# Patient Record
Sex: MALE | Race: WHITE | Employment: OTHER | URBAN - METROPOLITAN AREA
[De-identification: names, ages, dates, MRNs, and addresses within clinical notes are randomized per-mention and may not be internally consistent; named-entity substitution may affect disease eponyms.]

---

## 2017-01-03 ENCOUNTER — HOSPITAL ENCOUNTER (OUTPATIENT)
Dept: LAB | Age: 67
Discharge: HOME | End: 2017-01-03
Attending: INTERNAL MEDICINE
Payer: COMMERCIAL

## 2017-01-03 DIAGNOSIS — R55 SYNCOPE, UNSPECIFIED SYNCOPE TYPE: ICD-10-CM

## 2017-01-03 PROBLEM — M54.2 NECK PAIN: Status: ACTIVE | Noted: 2017-01-03

## 2017-01-03 PROBLEM — I10 ESSENTIAL HYPERTENSION WITH GOAL BLOOD PRESSURE LESS THAN 130/85: Status: ACTIVE | Noted: 2017-01-03

## 2017-01-03 LAB
ANION GAP BLD CALC-SCNC: 9 MMOL/L
BASOPHILS # BLD AUTO: 0 K/UL (ref 0–0.2)
BASOPHILS # BLD: 0 % (ref 0–2)
BUN SERPL-MCNC: 13 MG/DL (ref 8–23)
CALCIUM SERPL-MCNC: 8.6 MG/DL (ref 8.3–10.4)
CHLORIDE SERPL-SCNC: 106 MMOL/L (ref 98–107)
CO2 SERPL-SCNC: 27 MMOL/L (ref 23–32)
CREAT SERPL-MCNC: 1 MG/DL (ref 0.6–1)
DIFFERENTIAL METHOD BLD: ABNORMAL
EOSINOPHIL # BLD: 0.3 K/UL (ref 0–0.8)
EOSINOPHIL NFR BLD: 3 % (ref 0.5–7.8)
ERYTHROCYTE [DISTWIDTH] IN BLOOD BY AUTOMATED COUNT: 14.6 % (ref 11.9–14.6)
GLUCOSE SERPL-MCNC: 87 MG/DL (ref 65–100)
HCT VFR BLD AUTO: 42.8 % (ref 41.1–50.3)
HGB BLD-MCNC: 14.5 G/DL (ref 13.6–17.2)
LYMPHOCYTES # BLD AUTO: 18 % (ref 13–44)
LYMPHOCYTES # BLD: 1.4 K/UL (ref 0.5–4.6)
MCH RBC QN AUTO: 28.9 PG (ref 26.1–32.9)
MCHC RBC AUTO-ENTMCNC: 33.9 G/DL (ref 31.4–35)
MCV RBC AUTO: 85.3 FL (ref 79.6–97.8)
MONOCYTES # BLD: 0.5 K/UL (ref 0.1–1.3)
MONOCYTES NFR BLD AUTO: 6 % (ref 4–12)
NEUTS SEG # BLD: 5.8 K/UL (ref 1.7–8.2)
NEUTS SEG NFR BLD AUTO: 73 % (ref 43–78)
PLATELET # BLD AUTO: 248 K/UL (ref 150–450)
PMV BLD AUTO: 10.1 FL (ref 10.8–14.1)
POTASSIUM SERPL-SCNC: 4.2 MMOL/L (ref 3.5–5.1)
RBC # BLD AUTO: 5.02 M/UL (ref 4.23–5.67)
SODIUM SERPL-SCNC: 142 MMOL/L (ref 136–145)
WBC # BLD AUTO: 7.9 K/UL (ref 4.3–11.1)

## 2017-01-03 PROCEDURE — 80048 BASIC METABOLIC PNL TOTAL CA: CPT | Performed by: INTERNAL MEDICINE

## 2017-01-03 PROCEDURE — 36415 COLL VENOUS BLD VENIPUNCTURE: CPT | Performed by: INTERNAL MEDICINE

## 2017-01-03 PROCEDURE — 85025 COMPLETE CBC W/AUTO DIFF WBC: CPT | Performed by: INTERNAL MEDICINE

## 2017-01-06 ENCOUNTER — HOSPITAL ENCOUNTER (OUTPATIENT)
Dept: CT IMAGING | Age: 67
Discharge: HOME | End: 2017-01-06
Attending: INTERNAL MEDICINE
Payer: COMMERCIAL

## 2017-01-06 DIAGNOSIS — R55 SYNCOPE, UNSPECIFIED SYNCOPE TYPE: ICD-10-CM

## 2017-01-06 PROCEDURE — 70450 CT HEAD/BRAIN W/O DYE: CPT

## 2017-01-16 NOTE — PROGRESS NOTES
Pls call to say head CT was unremarkable. He has mild carotid disease on ultrasound, no significant blockage.   DEA

## 2017-02-21 PROBLEM — I77.9 BILATERAL CAROTID ARTERY DISEASE (HCC): Status: ACTIVE | Noted: 2017-02-21

## 2017-02-21 PROBLEM — I71.20 THORACIC AORTIC ANEURYSM WITHOUT RUPTURE: Status: ACTIVE | Noted: 2017-02-21

## 2017-07-18 ENCOUNTER — HOSPITAL ENCOUNTER (OUTPATIENT)
Dept: CT IMAGING | Age: 67
Discharge: HOME | End: 2017-07-18
Attending: INTERNAL MEDICINE
Payer: MEDICARE

## 2017-07-18 DIAGNOSIS — I71.20 THORACIC AORTIC ANEURYSM WITHOUT RUPTURE: ICD-10-CM

## 2017-07-18 DIAGNOSIS — I10 ESSENTIAL HYPERTENSION: ICD-10-CM

## 2017-07-18 LAB — CREAT BLD-MCNC: 1.1 MG/DL (ref 0.6–1)

## 2017-07-18 PROCEDURE — 74011636320 HC RX REV CODE- 636/320: Performed by: INTERNAL MEDICINE

## 2017-07-18 PROCEDURE — 74011000258 HC RX REV CODE- 258: Performed by: INTERNAL MEDICINE

## 2017-07-18 PROCEDURE — 71275 CT ANGIOGRAPHY CHEST: CPT

## 2017-07-18 PROCEDURE — 82565 ASSAY OF CREATININE: CPT

## 2017-07-18 RX ORDER — SODIUM CHLORIDE 0.9 % (FLUSH) 0.9 %
10 SYRINGE (ML) INJECTION
Status: COMPLETED | OUTPATIENT
Start: 2017-07-18 | End: 2017-07-18

## 2017-07-18 RX ADMIN — Medication 10 ML: at 09:24

## 2017-07-18 RX ADMIN — SODIUM CHLORIDE 100 ML: 900 INJECTION, SOLUTION INTRAVENOUS at 09:24

## 2017-07-18 RX ADMIN — IOPAMIDOL 119 ML: 755 INJECTION, SOLUTION INTRAVENOUS at 09:24

## 2017-07-18 NOTE — PROGRESS NOTES
Please call patient, CT was ok. Nothing emergent. But, I would like him to see Vascular Surgery on consult to review  high-grade ostial stenosis of the right vertebral artery. Again, everything was ok, just want another opinion.   Thanks, Janell Pedroza

## 2017-07-18 NOTE — PROGRESS NOTES
informed pt.of DR. Angel's response to CT and he voiced understanding. He is in agreement to having vascular surgery consult. Note was routed to scheduling to complete referral to vascular surgery.   Orders Placed This Encounter   VASCULAR SURGERY

## 2018-01-23 PROBLEM — I47.1 ATRIAL TACHYCARDIA, PAROXYSMAL (HCC): Status: ACTIVE | Noted: 2018-01-23

## 2018-05-04 PROBLEM — R55 SYNCOPE: Status: RESOLVED | Noted: 2017-01-03 | Resolved: 2018-05-04

## 2019-01-14 ENCOUNTER — HOSPITAL ENCOUNTER (OUTPATIENT)
Dept: LAB | Age: 69
Discharge: HOME | End: 2019-01-14

## 2019-01-14 PROCEDURE — 88305 TISSUE EXAM BY PATHOLOGIST: CPT

## 2019-01-25 ENCOUNTER — HOSPITAL ENCOUNTER (OUTPATIENT)
Dept: CT IMAGING | Age: 69
Discharge: HOME | End: 2019-01-25
Attending: INTERNAL MEDICINE
Payer: MEDICARE

## 2019-01-25 DIAGNOSIS — I71.20 THORACIC AORTIC ANEURYSM WITHOUT RUPTURE: ICD-10-CM

## 2019-01-25 LAB — CREAT BLD-MCNC: 1.2 MG/DL (ref 0.8–1.5)

## 2019-01-25 PROCEDURE — 71275 CT ANGIOGRAPHY CHEST: CPT

## 2019-01-25 PROCEDURE — 82565 ASSAY OF CREATININE: CPT

## 2019-01-25 PROCEDURE — 74011000258 HC RX REV CODE- 258: Performed by: INTERNAL MEDICINE

## 2019-01-25 PROCEDURE — 74011636320 HC RX REV CODE- 636/320: Performed by: INTERNAL MEDICINE

## 2019-01-25 RX ORDER — SODIUM CHLORIDE 0.9 % (FLUSH) 0.9 %
10 SYRINGE (ML) INJECTION
Status: COMPLETED | OUTPATIENT
Start: 2019-01-25 | End: 2019-01-25

## 2019-01-25 RX ADMIN — Medication 10 ML: at 13:18

## 2019-01-25 RX ADMIN — IOPAMIDOL 120 ML: 755 INJECTION, SOLUTION INTRAVENOUS at 13:18

## 2019-01-25 RX ADMIN — SODIUM CHLORIDE 100 ML: 900 INJECTION, SOLUTION INTRAVENOUS at 13:18

## 2019-01-25 NOTE — PROGRESS NOTES
Please call him, CTA was good, stable aneurysm, good news. I will see as planned, but great news.  Thanks

## 2019-12-23 PROBLEM — E66.09 CLASS 1 OBESITY DUE TO EXCESS CALORIES WITH SERIOUS COMORBIDITY AND BODY MASS INDEX (BMI) OF 30.0 TO 30.9 IN ADULT: Status: ACTIVE | Noted: 2019-12-23

## 2020-12-28 PROBLEM — J61 ASBESTOSIS (HCC): Status: ACTIVE | Noted: 2020-12-28

## 2020-12-28 PROBLEM — R35.1 NOCTURIA: Status: ACTIVE | Noted: 2020-12-28

## 2021-03-30 PROBLEM — J30.2 SEASONAL ALLERGIC RHINITIS: Status: ACTIVE | Noted: 2021-03-30

## 2022-03-18 PROBLEM — I47.1 ATRIAL TACHYCARDIA, PAROXYSMAL (HCC): Status: ACTIVE | Noted: 2018-01-23

## 2022-03-18 PROBLEM — E66.09 CLASS 1 OBESITY DUE TO EXCESS CALORIES WITH SERIOUS COMORBIDITY AND BODY MASS INDEX (BMI) OF 30.0 TO 30.9 IN ADULT: Status: ACTIVE | Noted: 2019-12-23

## 2022-03-18 PROBLEM — E66.811 CLASS 1 OBESITY DUE TO EXCESS CALORIES WITH SERIOUS COMORBIDITY AND BODY MASS INDEX (BMI) OF 30.0 TO 30.9 IN ADULT: Status: ACTIVE | Noted: 2019-12-23

## 2022-03-18 PROBLEM — I71.20 THORACIC AORTIC ANEURYSM WITHOUT RUPTURE (HCC): Status: ACTIVE | Noted: 2017-02-21

## 2022-03-18 PROBLEM — I47.19 ATRIAL TACHYCARDIA, PAROXYSMAL: Status: ACTIVE | Noted: 2018-01-23

## 2022-03-19 PROBLEM — R35.1 NOCTURIA: Status: ACTIVE | Noted: 2020-12-28

## 2022-03-19 PROBLEM — J30.2 SEASONAL ALLERGIC RHINITIS: Status: ACTIVE | Noted: 2021-03-30

## 2022-03-19 PROBLEM — I77.9 BILATERAL CAROTID ARTERY DISEASE (HCC): Status: ACTIVE | Noted: 2017-02-21

## 2022-03-19 PROBLEM — M54.2 NECK PAIN: Status: ACTIVE | Noted: 2017-01-03

## 2022-03-20 PROBLEM — J61 ASBESTOSIS (HCC): Status: ACTIVE | Noted: 2020-12-28

## 2022-06-07 ENCOUNTER — TELEPHONE (OUTPATIENT)
Dept: ORTHOPEDIC SURGERY | Age: 72
End: 2022-06-07

## 2022-08-18 DIAGNOSIS — R97.20 ELEVATED PSA: Primary | ICD-10-CM

## 2022-08-18 NOTE — PROGRESS NOTES
[facility]  [address] [place] [address]  [phone]          Gretchen Suárez  : 1950      INITIAL EVALUATION    Chief Complaint   Patient presents with    New Patient       HPI: Gretchen Suárez is a 71 y.o. male with Elevated PSA. Mr. Jarred Sanders lives in [place]. They spend a lot of time at the beach as well. He is  to my 6 . His primary care doctor checked a PSA and noted it was elevated at 4.1. In 2020 it was 2.2. At the time he also had some lower urinary tract symptoms primarily frequency urgency and nocturia. His urine was cultured and he did not have a UTI. He was started on Flomax and he states that his symptoms have improved greatly. He has no family history of prostate cancer. His mother had colon cancer and a paternal aunt had breast cancer. 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 He is done well since then. It was treated with chemoradiation. He also has a history of hypertension GERD CAD sleep apnea. Past surgical history includes multiple knee surgeries and a cardiac catheterization.       PMH:     Past Medical History:   Diagnosis Date    Abnormal EKG     Arthritis     osteo    CAD (coronary artery disease)     Cancer ([facility] [address] 2009    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     Chest pain, precordial     Chronic pain     knee    Coronary atherosclerosis of native coronary artery 2016    Dry mouth     since cancer treatment    GERD (gastroesophageal reflux disease)     controlled w/med    History of pneumonia 2007    Hypercholesteremia      diet controlled    Hypertension     controlled w/med    Malaise and fatigue     S/P total knee replacement using cement 2013    Shingles 2007    Sleep apnea     does not use or have CPAP anymore - sleeping good since 30-35 weight loss  per pt    Status post total History    Not on file   Tobacco Use    Smoking status: Never    Smokeless tobacco: Never   Substance and Sexual Activity    Alcohol use: Yes     Alcohol/week: 16.0 standard drinks    Drug use: No    Sexual activity: Not on file   Other Topics Concern    Not on file   Social History Narrative    Not on file     Social Determinants of Health     Financial Resource Strain: Not on file   Food Insecurity: Not on file   Transportation Needs: Not on file   Physical Activity: Not on file   Stress: Not on file   Social Connections: Not on file   Intimate Partner Violence: Not on file   Housing Stability: Not on file       ROS:   Review of Systems   Constitutional: Negative. Negative for chills, fatigue and fever. Respiratory: Negative. Cardiovascular: Negative. Gastrointestinal: Negative. Genitourinary: Negative. Negative for difficulty urinating, dysuria, flank pain, frequency, hematuria and urgency. Musculoskeletal: Negative. All other systems reviewed and are negative. PHYSICAL EXAM  GENERAL: Well-groomed, well-nourished, pleasant 71 y.o. male, in no acute distress. BP (!) 155/97   Pulse 73   Temp 98.4 °F (36.9 °C) (Oral)   Resp 16   Ht 5' 11\" (1.803 m)   Wt 211 lb 8 oz (95.9 kg)   SpO2 96%   BMI 29.50 kg/m²   General: well dressed, well nourished, no acute distress  Skin: no rashes  HEENT: Sclera are clear,normocephalic, atraumatic. no external lesions  Cardiovascular: Reg. Normal perfusion  Respiratory: normal respiratory effort, no JVD, no audible wheezing. Musculoskeletal: unremarkable with normal function. No embolic signs or cyanosis.    Neurologic exam: intact, no focal deficits, moves all 4 extremities  Psych: normal mood and affect, alert, oriented x 3  LE:  no edema  GI: soft, nontender, no masses, no CVA tenderness  GENITOURINARY: Circumcised phallus without mass or lesion; testes bilaterally descended without mass or nodule; no inguinal hernia; no pelvic adenopathy  SONIDO- 30/40+ cc gland; smooth, no nodules or induration      Labs:     Lab Results   Component Value Date/Time    PSA 5.9 08/19/2022 09:37 AM    PSA 2.2 12/28/2020 09:47 AM    PSA 1.8 12/23/2019 02:04 PM              Imaging/Radiology:        CT Results (maximum last 3):  === 01/25/19 ===    CTA CHEST W WO CONTRAST    - Narrative -  EXAM:  CTA CHEST W OR W WO CONT    INDICATION:  Thoracic aortic aneurysm    COMPARISON:  July 18, 2017    TECHNIQUE:  Multislice helical CT angiography of the chest was performed during uneventful  rapid bolus intravenous administration of contrast. Coronal and sagittal  reformations and MIP images and 3D shaded surface display renderings were  generated. CT dose reduction was achieved through use of a standardized  protocol tailored for this examination and automatic exposure control for dose  modulation. FINDINGS:  Neck base: Normal  Lungs: Small hazy focus in the right upper lobe is not significantly changed. A  few cysts/blebs are present. Mediastinum: Normal.  Cardiac: Normal.  Esophagus: Normal  Upper abdomen: No acute process. Bones: Normal.  CHEST WALL: Normal    Vasculature: Aorta: Mild enlargement of the ascending thoracic aorta measures 4.3 x 4.3 cm,  similar to the previous study. Mild atherosclerotic changes. There is a bovine  arch. The arch measures 2.9 cm. The descending aorta measures 2.6 cm. Other: Visualized great vessels are patent. Possible proximal right vertebral  artery stenosis again noted. - Impression -  IMPRESSION:Stable ascending aortic enlargement. ASSESSMENT: Young Araujo is a 71 y.o. male with Elevated PSA. His PSA was rechecked today and is up slightly at 5.9. I have recommended we get an MRI of his prostate and see him back to discuss next steps. He is going to be at the beach for the next several weeks but will arrange that scan when he returns. We reviewed with the patient the significance of an elevated PSA.   We discussed benign etiologies such as; benign enlargement of the prostate, inflammation of the prostate, infections of the prostate, and prostatic manipulation as a possible cause. We also discussed the possibility of prostate cancer. We explained that the main ways to diagnosis prostate cancer are with an ultrasound-guided biopsy or a prostate MRI followed by a biopsy. We explained that the MRI gives a view of the entire prostate and would possibly enable a targeted biopsy if an abnormality was seen on the MRI. If we do not do a prostate MRI upfront, then we could proceed with TRUS biopsy. Alternatively, we could repeat the PSA, do a free/total PSA, or a studies to help assess one's risk of significant prostate cancer (4K, PHI, PCA3, Select MDx, ExoDx, etc...). Otherwise, we could just manage the patient with observation and repeat PSA checks. We discussed the risks inherent in a biopsy including but not limited to; bleeding, infection, and pain associated with the procedure. We also discussed concerns with over-treatment of low-risk prostate cancer. After a full discussion regarding the potential risks, benefits, and treatment alternatives, the patient has decided to proceed with a prostate MRI if his PSA is over five and then consider prostate biopsy. ICD-10-CM    1. Elevated PSA  R97.20 MRI PELVIS W WO CONTRAST            PLAN:   -psa -if psa above 5 move forward with MRI   -aliya today   -prostate MRI  -RTC to review  ________________________________________      I have seen and examined this patient. I have reviewed and edited the note started by the MA and agree with the outlined plan. Part of this note was written by using a voice dictation software. The note has been proof read but may still contain some grammatical/other typographical errors.       Candiss Kocher, [address]  Urology

## 2022-08-19 ENCOUNTER — OFFICE VISIT (OUTPATIENT)
Dept: ONCOLOGY | Age: 72
End: 2022-08-19
Payer: MEDICARE

## 2022-08-19 ENCOUNTER — HOSPITAL ENCOUNTER (OUTPATIENT)
Dept: LAB | Age: 72
Discharge: HOME | End: 2022-08-22
Payer: MEDICARE

## 2022-08-19 VITALS
DIASTOLIC BLOOD PRESSURE: 97 MMHG | WEIGHT: 211.5 LBS | HEART RATE: 73 BPM | HEIGHT: 71 IN | TEMPERATURE: 98.4 F | BODY MASS INDEX: 29.61 KG/M2 | RESPIRATION RATE: 16 BRPM | SYSTOLIC BLOOD PRESSURE: 155 MMHG | OXYGEN SATURATION: 96 %

## 2022-08-19 DIAGNOSIS — R97.20 ELEVATED PSA: ICD-10-CM

## 2022-08-19 DIAGNOSIS — R97.20 ELEVATED PSA: Primary | ICD-10-CM

## 2022-08-19 LAB — PSA SERPL-MCNC: 5.9 NG/ML

## 2022-08-19 PROCEDURE — 3017F COLORECTAL CA SCREEN DOC REV: CPT | Performed by: UROLOGY

## 2022-08-19 PROCEDURE — G8427 DOCREV CUR MEDS BY ELIG CLIN: HCPCS | Performed by: UROLOGY

## 2022-08-19 PROCEDURE — 84153 ASSAY OF PSA TOTAL: CPT

## 2022-08-19 PROCEDURE — 99204 OFFICE O/P NEW MOD 45 MIN: CPT | Performed by: UROLOGY

## 2022-08-19 PROCEDURE — 36415 COLL VENOUS BLD VENIPUNCTURE: CPT

## 2022-08-19 PROCEDURE — 1036F TOBACCO NON-USER: CPT | Performed by: UROLOGY

## 2022-08-19 PROCEDURE — 1123F ACP DISCUSS/DSCN MKR DOCD: CPT | Performed by: UROLOGY

## 2022-08-19 PROCEDURE — G8417 CALC BMI ABV UP PARAM F/U: HCPCS | Performed by: UROLOGY

## 2022-08-19 RX ORDER — TAMSULOSIN HYDROCHLORIDE 0.4 MG/1
CAPSULE ORAL
COMMUNITY
Start: 2022-08-11 | End: 2022-10-24

## 2022-08-19 ASSESSMENT — PATIENT HEALTH QUESTIONNAIRE - PHQ9
SUM OF ALL RESPONSES TO PHQ QUESTIONS 1-9: 0
2. FEELING DOWN, DEPRESSED OR HOPELESS: 0
SUM OF ALL RESPONSES TO PHQ QUESTIONS 1-9: 0
SUM OF ALL RESPONSES TO PHQ9 QUESTIONS 1 & 2: 0
1. LITTLE INTEREST OR PLEASURE IN DOING THINGS: 0

## 2022-08-19 ASSESSMENT — ENCOUNTER SYMPTOMS
RESPIRATORY NEGATIVE: 1
GASTROINTESTINAL NEGATIVE: 1

## 2022-08-19 NOTE — PROGRESS NOTES
Ed Orellana [facility] (Uro/Onc)      MRN:  [id number]    PATIENT'S NAME:   Ed Orellana     LAST SEEN UROLOGIST:  MIGUEL    PCP:  Dr. Belen Stuart, [facility]     CHIEF COMPLAINT:  Elevated PSA     PMH:  Mr. Ed Orellana is a 81-year-old  male with a medical history of squamous cell base of tongue (12/2009, T2N2c, treatment chemo/rad at [facility]), chronic knee pain, HTN, hypercholesterolemia, GERD, CAD, sleep apnea, colon polyps, and arthritis. Surgical history includes multiple bilateral knee surgeries, right shoulder arthroscopy, and cardiac catherization. He denies use of any tobacco products or drugs but does consume alcohol. FAMILY HX:  Mother with colon cancer and paternal aunt with breast cancer and lymphoma. Additional history of heart disease, diabetes, and stroke. NARRATIVE WITH RECENT WITH RESULTS/PROCEDURES/BIOPSIES AND DATES COMPLETED:   On 7/14/22 patient was seen by Dr. Kenan Martin to establish care with new PCP. He reported increased frequency of urination during the night, 4-5 times per night. He was started on Flomax and PSA drawn was elevated to 4.129. Last documented PSA was 2.2 in 12/2020. Patient self-referred to [facility] urology/oncology for evaluation and recommendations for elevated PSA.       PROCEDURES/PATHOLOGY:  None     LABS:  7/14/22 [facility]              7/14/22 PSA    PSA Trend [id number]      IMAGING:  None     Covid vaccination - 9/1/21 and 9/24/21

## 2022-08-19 NOTE — PATIENT INSTRUCTIONS
Patient Instructions from Today's Visit    Reason for Visit:  New Patient- elevated PSA     Diagnosis Information:  [email or web address] net/about-us/[email or web address]-answers-patient-education-materials/skqi-orrshnh-[email or web address]-sheets       Plan: We are re checking your psa today    There are multiple reasons that could cause your PSA to elevated. Trauma, infection and inflammation are the most common. Of course Prostate Cancer is a possibility, however it is not the most common. Follow Up: If your psa is still elevated we would like to have a MRI of your prostate. We would then like to see you back in the office to review     Recent Lab Results:  N/a    Treatment Summary has been discussed and given to patient: n/a        -------------------------------------------------------------------------------------------------------------------  Please call our office at [phone] if you have any  of the following symptoms:   Fever of 100.5 or greater  Chills  Shortness of breath  Swelling or pain in one leg    After office hours an answering service is available and will contact a provider for emergencies or if you are experiencing any of the above symptoms. Patient does express an interest in My Chart. My Chart log in information explained on the after visit summary printout at the [address] [address] desk.     [facility]

## 2022-10-03 ENCOUNTER — HOSPITAL ENCOUNTER (OUTPATIENT)
Dept: MRI IMAGING | Age: 72
Discharge: HOME | End: 2022-10-06
Payer: MEDICARE

## 2022-10-03 DIAGNOSIS — R97.20 ELEVATED PSA: ICD-10-CM

## 2022-10-03 PROCEDURE — 2580000003 HC RX 258: Performed by: UROLOGY

## 2022-10-03 PROCEDURE — 6360000004 HC RX CONTRAST MEDICATION: Performed by: UROLOGY

## 2022-10-03 PROCEDURE — A9579 GAD-BASE MR CONTRAST NOS,1ML: HCPCS | Performed by: UROLOGY

## 2022-10-03 PROCEDURE — 72197 MRI PELVIS W/O & W/DYE: CPT

## 2022-10-03 RX ORDER — SODIUM CHLORIDE 0.9 % (FLUSH) 0.9 %
30 SYRINGE (ML) INJECTION AS NEEDED
Status: DISCONTINUED | OUTPATIENT
Start: 2022-10-03 | End: 2022-10-07 | Stop reason: HOSPADM

## 2022-10-03 RX ADMIN — SODIUM CHLORIDE, PRESERVATIVE FREE 30 ML: 5 INJECTION INTRAVENOUS at 09:26

## 2022-10-03 RX ADMIN — GADOTERIDOL 20 ML: 279.3 INJECTION, SOLUTION INTRAVENOUS at 09:26

## 2022-10-04 ENCOUNTER — OFFICE VISIT (OUTPATIENT)
Dept: CARDIOLOGY CLINIC | Age: 72
End: 2022-10-04
Payer: MEDICARE

## 2022-10-04 VITALS
HEIGHT: 71 IN | SYSTOLIC BLOOD PRESSURE: 134 MMHG | DIASTOLIC BLOOD PRESSURE: 88 MMHG | HEART RATE: 81 BPM | WEIGHT: 215 LBS | BODY MASS INDEX: 30.1 KG/M2

## 2022-10-04 DIAGNOSIS — I10 PRIMARY HYPERTENSION: ICD-10-CM

## 2022-10-04 DIAGNOSIS — I47.1 ATRIAL TACHYCARDIA, PAROXYSMAL (HCC): ICD-10-CM

## 2022-10-04 DIAGNOSIS — I71.21 ANEURYSM OF ASCENDING AORTA WITHOUT RUPTURE: ICD-10-CM

## 2022-10-04 DIAGNOSIS — I25.10 ATHEROSCLEROSIS OF NATIVE CORONARY ARTERY OF NATIVE HEART WITHOUT ANGINA PECTORIS: ICD-10-CM

## 2022-10-04 DIAGNOSIS — I47.1 SUPRAVENTRICULAR TACHYCARDIA (HCC): Primary | ICD-10-CM

## 2022-10-04 DIAGNOSIS — I65.23 BILATERAL CAROTID ARTERY STENOSIS: ICD-10-CM

## 2022-10-04 PROCEDURE — 99214 OFFICE O/P EST MOD 30 MIN: CPT | Performed by: INTERNAL MEDICINE

## 2022-10-04 PROCEDURE — G8484 FLU IMMUNIZE NO ADMIN: HCPCS | Performed by: INTERNAL MEDICINE

## 2022-10-04 PROCEDURE — G8417 CALC BMI ABV UP PARAM F/U: HCPCS | Performed by: INTERNAL MEDICINE

## 2022-10-04 PROCEDURE — 1036F TOBACCO NON-USER: CPT | Performed by: INTERNAL MEDICINE

## 2022-10-04 PROCEDURE — 1123F ACP DISCUSS/DSCN MKR DOCD: CPT | Performed by: INTERNAL MEDICINE

## 2022-10-04 PROCEDURE — 93000 ELECTROCARDIOGRAM COMPLETE: CPT | Performed by: INTERNAL MEDICINE

## 2022-10-04 PROCEDURE — 3017F COLORECTAL CA SCREEN DOC REV: CPT | Performed by: INTERNAL MEDICINE

## 2022-10-04 PROCEDURE — G8427 DOCREV CUR MEDS BY ELIG CLIN: HCPCS | Performed by: INTERNAL MEDICINE

## 2022-10-04 RX ORDER — OMEPRAZOLE 20 MG/1
20 CAPSULE, DELAYED RELEASE ORAL
Qty: 90 CAPSULE | Refills: 1 | Status: SHIPPED | OUTPATIENT
Start: 2022-10-04

## 2022-10-04 NOTE — PROGRESS NOTES
[address], [address] [facility], [address] [place] [address]  PHONE: [phone]     10/04/22    NAME:  Phuong Guevara  : 1950  MRN: [id number]       SUBJECTIVE:   Phuong Guevara is a 71 y.o. male seen for a follow up visit regarding the following:     Chief Complaint   Patient presents with    Atrial Fibrillation     6 month f/u       HPI: Here for today for eval of CAD (mild CAD 1/15 [facility] after pos NST), HTN. 2017 Carotid US showed mod ALLIE stenosis. CT 2017 and 2019: Ao 4.4cm. Monitor with a tach, PVCs. Echo 2021: normal EF. Been to beach all summer, walking on the beach. Active walking, no angina. No angina, CP, LABOY. No new angina. Patient denies recent history of orthopnea, PND, excessive dizziness and/or syncope. Prior Head and Neck CA, yrs Ago, followed at [facility] yearly. Prior XRT. Tx in [id number]. Labs 2022 [facility]: LDL 65, HDL 40, Trig 51, HbA1c 5.5        Past Medical History, Past Surgical History, Family history, Social History, and Medications were all reviewed with the patient today and updated as necessary. Current Outpatient Medications   Medication Sig Dispense Refill    tamsulosin (FLOMAX) 0.4 MG capsule TAKE 1 CAPSULE (0.4 MG) DAILY      MAGNESIUM CARBONATE PO Take by mouth daily      POTASSIUM CITRATE PO Take by mouth daily      ZINC PO Take by mouth      ascorbic acid (VITAMIN C) 250 MG tablet Take 250 mg by mouth daily      aspirin 81 MG EC tablet Take by mouth daily      atorvastatin (LIPITOR) 10 MG tablet Take 10 mg by mouth daily      loratadine (CLARITIN) 10 MG tablet Take 10 mg by mouth       No current facility-administered medications for this visit.      Facility-Administered Medications Ordered in Other Visits   Medication Dose Route Frequency Provider Last Rate Last Admin    sodium chloride flush 0.9 % injection 30 mL  30 mL IntraVENous PRN Quynh Farnsworth MD   30 mL at 10/03/22 0926        Allergies   Allergen Reactions    Rosuvastatin Other (See Comments)     Patient Active Problem List    Diagnosis Date Noted    Seasonal allergic rhinitis 03/30/2021    Nocturia 12/28/2020    Asbestosis ([facility] [address] 12/28/2020    Class 1 obesity due to excess calories with serious comorbidity and body mass index (BMI) of 30.0 to 30.9 in adult 12/23/2019    Atrial tachycardia, paroxysmal ([facility] [address] 01/23/2018    Thoracic aortic aneurysm without rupture 02/21/2017    Bilateral carotid artery disease ([facility] [address] 02/21/2017    Neck pain 01/03/2017    Hypertension 04/13/2016    Atherosclerosis of native coronary artery of native heart without angina pectoris 04/13/2016    Status post total left knee replacement 12/30/2015    History of throat cancer 12/04/2014    S/P total knee replacement using cement 11/25/2013    History of pneumonia     Hypercholesteremia 02/28/2013    Sleep apnea 02/28/2013    GERD (gastroesophageal reflux disease) 02/28/2013      Past Surgical History:   Procedure Laterality Date    CARDIAC CATHETERIZATION      COLONOSCOPY  1/04    Dr. Harika Negron with normal findings    COLONOSCOPY  01/14/2019    Dr. Olga Mascorro; diverticulosis and 2 fragments of tubular adenomas    KNEE ARTHROSCOPY Left 1990    SHOULDER ARTHROSCOPY Right     TOTAL KNEE ARTHROPLASTY Left 12/30/2015    Dr. Donna Underwood Bilateral 2013, 2014    total    TOTAL KNEE ARTHROPLASTY Bilateral 2005    Partial -      Family History   Problem Relation Age of Onset    Cancer Mother 62        colon    Heart Disease Father         pacemaker    Heart Disease Maternal Grandmother     Heart Disease Maternal Grandfather     Diabetes Mother     Stroke Paternal Grandfather     Cancer Paternal Aunt         lymphoma, breast     Social History     Tobacco Use    Smoking status: Never    Smokeless tobacco: Never   Substance Use Topics    Alcohol use:  Yes     Alcohol/week: 16.0 standard drinks         ROS:    No obvious pertinent positives on review of systems except for what was outlined in the HPI today. PHYSICAL EXAM:     /88   Pulse 81 Comment: per EKG  Ht 5' 11\" (1.803 m)   Wt 215 lb (97.5 kg)   BMI 29.99 kg/m²    General/Constitutional:   Alert and oriented x 3, no acute distress  HEENT:   normocephalic, atraumatic, moist mucous membranes  Neck:   No JVD or carotid bruits bilaterally  Cardiovascular:   regular rate and rhythm, no murmur/rub/gallop appreciated  Pulmonary:   clear to auscultation bilaterally, no respiratory distress  Abdomen:   soft, non-tender, non-distended  Ext:   No sig LE edema bilaterally  Skin:  warm and dry, no obvious rashes seen  Neuro:   no obvious sensory or motor deficits  Psychiatric:   normal mood and affect      EKG Today and independently reviewed by me: sinus rhythm, normal intervals and non-specific ST/T wave changes.         Lab Results   Component Value Date/Time     12/28/2020 09:47 AM    K 4.3 12/28/2020 09:47 AM     12/28/2020 09:47 AM    CO2 22 12/28/2020 09:47 AM    BUN 14 12/28/2020 09:47 AM    CREATININE 0.91 12/28/2020 09:47 AM    GLUCOSE 99 12/28/2020 09:47 AM    CALCIUM 8.9 12/28/2020 09:47 AM        Lab Results   Component Value Date    WBC 5.9 12/28/2020    HGB 16.2 12/28/2020    HCT 47.1 12/28/2020    MCV 87 12/28/2020     12/28/2020       Lab Results   Component Value Date    TSH 3.120 12/23/2019       Lab Results   Component Value Date    LABA1C 5.3 12/23/2019     Lab Results   Component Value Date     12/23/2019       Lab Results   Component Value Date    CHOL 100 03/30/2021    CHOL 154 12/28/2020    CHOL 152 12/23/2019     Lab Results   Component Value Date    TRIG 40 03/30/2021    TRIG 55 12/28/2020    TRIG 78 12/23/2019     Lab Results   Component Value Date    HDL 35 (L) 03/30/2021    HDL 37 (L) 12/28/2020    HDL 34 (L) 12/23/2019     Lab Results   Component Value Date    LDLCALC 54 03/30/2021    LDLCALC 106 (H) 12/28/2020    LDLCALC 102 (H) 12/23/2019     Lab Results   Component Value Date    LABVLDL 16 12/23/2019    VLDL 11 03/30/2021    VLDL 11 12/28/2020     No results found for: SANDEE        I have Independently reviewed prior care notes, any ER records available, cardiac testing, labs and results with the patient and before seeing the patient today. Also independently reviewed outside records when available. ASSESSMENT:    Jeremi Koehler was seen today for atrial fibrillation. Diagnoses and all orders for this visit:    Supraventricular tachycardia ([address]  -     EKG 12 lead    Atrial tachycardia, paroxysmal ([address]    Primary hypertension    Aneurysm of ascending aorta without rupture  -     [facility] Karoline Echavarria MD, Vascular Surgery, [place]    Atherosclerosis of native coronary artery of native heart without angina pectoris    Bilateral carotid artery stenosis  -     [facility] - Analilia Nix MD, Vascular Surgery, [place]        PLAN:    1. Thor Aneurysm:  Seen by vascular, surgery would be very challenging if ever needed given prior XRT. No BB with low HR in the past, follow for now. BP better now, doing better now. No more CT scans for now. Refer back to vascular. 2. HTN:  Lower, off the Ace, follow BP. Needs more water intake. 3. Carotid Dz: Seen by vascular, no plan for more testing. Follow for now. Remain on ASA. [facility] every 2 yrs now. 4. A tach:  asx now, follow for more sx. Add BB as needed in the future. HR lower at home. Follow for PAF sx.          5. HPL: remain on lipitor. Patient has been instructed and agrees to call our office with any issues or other concerns related to their cardiac condition(s) and/or complaint(s). Return in about 6 months (around 4/4/2023).        MILAGRO OLIVAS DO  10/4/2022

## 2022-10-06 NOTE — PROGRESS NOTES
[facility]  [id number] [facility]  [place], [address] [place] [address]  [phone]        Mr. Leny Sprague is a 71 y.o. male with a diagnosis of elevated PSA. INTERVAL HISTORY: Patient is here today for follow-up after his prostate MRI. It was performed on 10/3/2022. It showed a 2 cm x 1 cm lesion in the bilateral anterior mid gland down to the apex of the transition zone. It was a PI-RADS 5 lesion. He also had a 1.4 cm lesion in the right anterior mid gland that was a PI-RADS 3 lesion. There was some suspicious enhancement in the distal right seminal vesicle that was concerning as well. There was no pelvic adenopathy or bony lesions noted. He has had no changes in his overall health. His digital rectal exam was normal at the last appointment. His PSA was up to 5.9 when we last saw him. He has no known drug allergies. His only anticoagulant is 81 mg of aspirin daily. From previous note:  Leny Sprague is a 71 y.o. male with Elevated PSA. Mr. Tiffany Vallejo lives in [place]. They spend a lot of time at the [place] as well. He is  to my 6 . His primary care doctor checked a PSA and noted it was elevated at 4.1. In 12/20/2020 it was 2.2. At the time he also had some lower urinary tract symptoms primarily frequency urgency and nocturia. His urine was cultured and he did not have a UTI. He was started on Flomax and he states that his symptoms have improved greatly. He has no family history of prostate cancer. His mother had colon cancer and a paternal aunt had breast cancer. 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 He is done well since then. It was treated with chemoradiation. He also has a history of hypertension GERD CAD sleep apnea. Past surgical history includes multiple knee surgeries and a cardiac catheterization.     Past medical, family and social histories, as well as medications and allergies, were reviewed and updated in the medical record as appropriate. PMH:     Past Medical History:   Diagnosis Date    Abnormal EKG     Arthritis     osteo    CAD (coronary artery disease)     Cancer ([facility] [address] 2009    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     Chest pain, precordial     Chronic pain     knee    Coronary atherosclerosis of native coronary artery 4/13/2016    Dry mouth     since cancer treatment    GERD (gastroesophageal reflux disease)     controlled w/med    History of pneumonia 2007    Hypercholesteremia      diet controlled    Hypertension     controlled w/med    Malaise and fatigue     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 Negative for chills, fatigue and fever. Respiratory: Negative. Cardiovascular: Negative. Gastrointestinal: Negative. Genitourinary: Negative. Negative for difficulty urinating, dysuria, flank pain, frequency, hematuria and urgency. Musculoskeletal: Negative. All other systems reviewed and are negative. PHYSICAL EXAMINATION    There were no vitals taken for this visit. General: well dressed, well nourished, no acute distress  Skin: no rashes  HEENT: Sclera are clear,normocephalic, atraumatic. no external lesions   Cardiovascular: Reg. Normal perfusion  Respiratory: normal respiratory effort, no JVD, no audible wheezing. Musculoskeletal: unremarkable with normal function. No embolic signs or cyanosis. Neurologic exam: intact, no focal deficits, moves all 4 extremities  Psych: normal mood and affect, alert, oriented x 3  LE:  no edema  GI: soft, nontender, no masses, no CVA tenderness  : DEFERRED       LABORATORY RESULTS:    Lab Results   Component Value Date/Time    PSA 5.9 08/19/2022 09:37 AM    PSA 2.2 12/28/2020 09:47 AM    PSA 1.8 12/23/2019 02:04 PM         IMAGING:    XR Results:    No results found for this or any previous visit. CT Results:    === 01/25/19 ===    CTA CHEST W WO CONTRAST    - Narrative -  EXAM:  CTA CHEST W OR W WO CONT    INDICATION:  Thoracic aortic aneurysm    COMPARISON:  July 18, 2017    TECHNIQUE:  Multislice helical CT angiography of the chest was performed during uneventful  rapid bolus intravenous administration of contrast. Coronal and sagittal  reformations and MIP images and 3D shaded surface display renderings were  generated. CT dose reduction was achieved through use of a standardized  protocol tailored for this examination and automatic exposure control for dose  modulation. FINDINGS:  Neck base: Normal  Lungs: Small hazy focus in the right upper lobe is not significantly changed. A  few cysts/blebs are present. Mediastinum: Normal.  Cardiac: Normal.  Esophagus: Normal  Upper abdomen: No acute process. Bones: Normal.  CHEST WALL: Normal    Vasculature: Aorta: Mild enlargement of the ascending thoracic aorta measures 4.3 x 4.3 cm,  similar to the previous study. Mild atherosclerotic changes. There is a bovine  arch. The arch measures 2.9 cm. The descending aorta measures 2.6 cm. Other: Visualized great vessels are patent. Possible proximal right vertebral  artery stenosis again noted. - Impression -  IMPRESSION:Stable ascending aortic enlargement. MRI Results:    === 10/03/22 ===    MRI PROSTATE W WO CONTRAST    - Narrative -  MRI PELVIS WITHOUT AND WITH CONTRAST, 10/3/2022    History: Elevated PSA.     Technique: Axial, sagittal, coronal T2; axial diffusion-weighted with calculated  ADC map; axial fat-saturated T1 images were followed by followed by 20 cc  intravenous ProHance, following which multiplanar fat-saturated T1 weighted  imaging to include multiphase axial images were obtained. Findings:  The prostate gland measures: 5.1 cm transverse by 4 cm AP by 4.7 cm  craniocaudal.  The most recent PSA level at this institution is dated 8/19/2022  measured at 5.9 ng/mL. This calculates to a PSA density of 0.118 ng/mL/cc. Peripheral Zone: There is an ill-defined T2 hypointense lesion involving the  right anterior mid gland/apex peripheral zone seen on axial T2-weighted image  16. This demonstrates associated marked signal loss on ADC map images although  only moderate increased signal is seen on high be evaluated diffusion-weighted  images. No clear early focal enhancement is seen at this level. There is an  additional 1.4 cm x 0.9 cm lesion involving the left anterior and posterior base  peripheral zone seen on axial T2-weighted image 9. However, this only  demonstrates mild to moderate associated diffusion-weighted imaging signal  changes, and no early focal enhancement is seen. Central Gland: There is a lentiform lesion occupying the anterior mid gland and  apex bridging the anterior left and right changes in zones measuring 2 cm x 1 cm  in greatest transverse dimensions. This demonstrates moderate to severe patchy  diffusion-weighted imaging signal changes. In addition, this demonstrates clear  postcontrast enhancement and therefore is not felt to represent benign anterior  fibromuscular stroma. Clinically significant tumor is not excluded. No  distinct Central gland lesion is otherwise seen. Prostate capsule: No suspicious focal contour change, or gross extracapsular  enhancing tumor.     Seminal vesicles: Enhancement and diffusion-weighted imaging signal changes are  seen in the more distal right seminal vesicle best appreciated on  diffusion-weighted images 5 and axial postcontrast image 53. The appearance  raises concern for involvement of the distal right seminal vesicle by tumor. Neurovascular bundles: No clear evidence for involvement. Lymph nodes: No pelvic lymphadenopathy. Bones: No suspicious enhancing osseous lesion. Other:    - Impression -  1. Peripheral zone changes as follows:  -1.4 cm x 0.9 cm lesion in the right anterior mid gland/apex peripheral zone:  PIRADS 3    2. Central gland changes as follows:  -2 cm x 1 cm lesion involving the bilateral anterior mid gland and apex  transition zone: PIRADS 5    3. Suspicious enhancement in diffusion-weighted imaging signal changes in the  distal right seminal vesicle concerning for involvement of the distal right  seminal vesicle by potential prostate tumor. 4.  No findings concerning for metastatic lesions in the visualized pelvis. ASSESSMENT:    Mr. Winston Mark is a 71 y.o. male with a diagnosis of elevated PSA. I am concerned about some of the findings on his MRI and I recommended he move forward with the MRI fusion guided biopsy of the prostate. We discussed the risks and alternatives of that procedure as outlined below. I have asked him to stop his aspirin approximately a week ahead of time. We discussed the risks of bleeding and infection. We will plan to give him 2 tablets of Cipro to take the day of the procedure. We will plan to give him Rocephin intraoperatively. We reviewed with the patient the significance of an elevated PSA. We discussed benign etiologies such as; benign enlargement of the prostate, inflammation of the prostate, infections of the prostate, and prostatic manipulation as a possible cause. We also discussed the possibility of prostate cancer. The only way to really differentiate this would be a biopsy of the prostate.  The most common way to diagnose prostate cancer is an ultrasound-guided biopsy of the prostate with our without MRI fusion. Alternatively, we could repeat the PSA, do a free/total PSA, or just manage the patient with observation. We discussed the risks inherent in a biopsy including but not limited to; bleeding, infection, urinary retention, and pain associated with the procedure. We recommend the patient stop any aspirin or nonsteroidal anti-inflammatories or other blood thinners approximately 5 days prior to the procedure. If the patient is taking any of these medications for heart issues or other specific reasons, he needs to consult with the prescribing doctor before stopping treatment. We explained that we give him prophylactic antibiotic(s) around the time of the procedure to help prevent serious infectious complications (which have been on the rise). We also discussed concerns with over-treatment of prostate cancer and that the main purpose of the biopsy is to hopefully rule out high-grade prostate cancer. After a full discussion regarding the potential risks, benefits, and treatment alternatives, the patient has decided to proceed with a prostate biopsy. PLAN:   -review MRI PELV  -rec'd TRUS fusion prostate bx. R/B's reviewed, printout given  -cipro 500mg x2 tablets sent to pharmacy  -rtc after bx for pathology     ________________________________________      I have seen and examined this patient. I have reviewed and edited the note started by the MA and agree with the outlined plan. Part of this note was written by using a voice dictation software. The note has been proof read but may still contain some grammatical/other typographical errors.       Eileen Renee  Urology

## 2022-10-07 ENCOUNTER — OFFICE VISIT (OUTPATIENT)
Dept: ONCOLOGY | Age: 72
End: 2022-10-07
Payer: MEDICARE

## 2022-10-07 VITALS
DIASTOLIC BLOOD PRESSURE: 90 MMHG | WEIGHT: 207 LBS | RESPIRATION RATE: 18 BRPM | OXYGEN SATURATION: 97 % | BODY MASS INDEX: 28.98 KG/M2 | HEIGHT: 71 IN | HEART RATE: 73 BPM | TEMPERATURE: 98.2 F | SYSTOLIC BLOOD PRESSURE: 144 MMHG

## 2022-10-07 DIAGNOSIS — R97.20 ELEVATED PSA: Primary | ICD-10-CM

## 2022-10-07 PROCEDURE — 1123F ACP DISCUSS/DSCN MKR DOCD: CPT | Performed by: UROLOGY

## 2022-10-07 PROCEDURE — 99214 OFFICE O/P EST MOD 30 MIN: CPT | Performed by: UROLOGY

## 2022-10-07 PROCEDURE — 3017F COLORECTAL CA SCREEN DOC REV: CPT | Performed by: UROLOGY

## 2022-10-07 PROCEDURE — G8427 DOCREV CUR MEDS BY ELIG CLIN: HCPCS | Performed by: UROLOGY

## 2022-10-07 PROCEDURE — G8417 CALC BMI ABV UP PARAM F/U: HCPCS | Performed by: UROLOGY

## 2022-10-07 PROCEDURE — 1036F TOBACCO NON-USER: CPT | Performed by: UROLOGY

## 2022-10-07 PROCEDURE — G8484 FLU IMMUNIZE NO ADMIN: HCPCS | Performed by: UROLOGY

## 2022-10-07 RX ORDER — ACETAMINOPHEN 160 MG
TABLET,DISINTEGRATING ORAL DAILY
COMMUNITY

## 2022-10-07 RX ORDER — CIPROFLOXACIN 500 MG/1
TABLET, FILM COATED ORAL
Qty: 2 TABLET | Refills: 0 | Status: SHIPPED | OUTPATIENT
Start: 2022-10-07

## 2022-10-07 ASSESSMENT — ENCOUNTER SYMPTOMS
RESPIRATORY NEGATIVE: 1
GASTROINTESTINAL NEGATIVE: 1

## 2022-10-07 ASSESSMENT — PATIENT HEALTH QUESTIONNAIRE - PHQ9
2. FEELING DOWN, DEPRESSED OR HOPELESS: 0
SUM OF ALL RESPONSES TO PHQ QUESTIONS 1-9: 0
SUM OF ALL RESPONSES TO PHQ9 QUESTIONS 1 & 2: 0
SUM OF ALL RESPONSES TO PHQ QUESTIONS 1-9: 0
1. LITTLE INTEREST OR PLEASURE IN DOING THINGS: 0

## 2022-10-07 NOTE — PATIENT INSTRUCTIONS
Patient Instructions from Today's Visit    Reason for Visit:  Follow up, mri results    Diagnosis Information:  [email or web address]      Plan: Your mri does show some areas of concern. With that we suggest a biopsy of the prostate to determine if this is cancerous or not. Blood in the urine and stool for the first two days is normal. Blood in your ejaculate for the first few months is normal as well. This is an out patient procedure, we ill also send you home today with a prescription for an antibiotic. Additional information on the biopsy is included in this printout   We will then bring you back in the office 1-2 weeks after the biopsy to review the results     Follow Up: With the biopsy and then office visit to review    Recent Lab Results:  N/a    Treatment Summary has been discussed and given to patient: n/a        -------------------------------------------------------------------------------------------------------------------    Patient does express an interest in My Chart. My Chart log in information explained on the after visit summary printout at the [facility] desk.     AMADO Loredo

## 2022-10-13 ENCOUNTER — PREP FOR PROCEDURE (OUTPATIENT)
Dept: ONCOLOGY | Age: 72
End: 2022-10-13

## 2022-10-13 PROBLEM — R97.20 ELEVATED PROSTATE SPECIFIC ANTIGEN (PSA): Status: ACTIVE | Noted: 2022-10-13

## 2022-10-19 ENCOUNTER — TELEPHONE (OUTPATIENT)
Dept: ONCOLOGY | Age: 72
End: 2022-10-19

## 2022-10-19 NOTE — TELEPHONE ENCOUNTER
Occupational Therapy  Patient not seen in therapy.     On hold due to medical condition    Re-attempt plan: per established plan of care    OT evaluation on hold. Pt. positive for fracture of R femur. Pt. awaiting surgery.  OT will follow up as schedule permits.        OBJECTIVE                      Therapy procedure time and total treatment time can be found documented on the Time Entry flowsheet   Pt called stating he was awaiting a call about getting a biopsy procedure scheduled. Pt had an appt on 10/7 and said he was told it would be set up following that appt.

## 2022-10-24 NOTE — PRE-PROCEDURE INSTRUCTIONS
Patient verified name and . Order for consent was found in EHR and matches case posting; patient verifies procedure. Type IA surgery, phone assessment complete. Orders were received. Labs per surgeon: none found for PAT  Labs per anesthesia protocol: none. Patient answered medical/surgical history questions at their best of ability. All prior to admission medications documented in [facility]. Patient instructed to take the following medications the day of surgery according to anesthesia guidelines with a small sip of water: none On the day before surgery please take Acetaminophen 1000mg in the morning and then again before bed. You may substitute for Tylenol 650 mg. Hold all vitamins 7 days prior to surgery and NSAIDS 5 days prior to surgery. Prescription meds to hold:none  Patient instructed on the following:    > Arrive at [facility], time of arrival to be called the day before by 1700  > NPO after midnight, unless otherwise indicated, including gum, mints, and ice chips  > Responsible adult must drive patient to the hospital, stay during surgery, and patient will need supervision 24 hours after anesthesia  > Use antibacterial soap in shower the night before surgery and on the morning of surgery  > All piercings must be removed prior to arrival.    > Leave all valuables (money and jewelry) at home but bring insurance card and ID on DOS.   > You may be required to pay a deductible or co-pay on the day of your procedure. You can pre-pay by calling [phone] if your surgery is at the [facility] or [phone] if your surgery is at the [facility]. > Do not wear make-up, nail polish, lotions, cologne, perfumes, powders, or oil on skin. Artificial nails are not permitted.

## 2022-10-26 ENCOUNTER — ANESTHESIA EVENT (OUTPATIENT)
Dept: SURGERY | Age: 72
End: 2022-10-26
Payer: MEDICARE

## 2022-10-27 ENCOUNTER — ANESTHESIA (OUTPATIENT)
Dept: SURGERY | Age: 72
End: 2022-10-27
Payer: MEDICARE

## 2022-10-27 ENCOUNTER — HOSPITAL ENCOUNTER (OUTPATIENT)
Age: 72
Setting detail: OUTPATIENT SURGERY
Discharge: HOME | End: 2022-10-27
Attending: UROLOGY | Admitting: UROLOGY
Payer: MEDICARE

## 2022-10-27 VITALS
HEIGHT: 72 IN | SYSTOLIC BLOOD PRESSURE: 136 MMHG | BODY MASS INDEX: 28.99 KG/M2 | RESPIRATION RATE: 20 BRPM | OXYGEN SATURATION: 97 % | HEART RATE: 67 BPM | DIASTOLIC BLOOD PRESSURE: 93 MMHG | TEMPERATURE: 97.4 F | WEIGHT: 214 LBS

## 2022-10-27 DIAGNOSIS — R97.20 ELEVATED PROSTATE SPECIFIC ANTIGEN (PSA): ICD-10-CM

## 2022-10-27 LAB
APPEARANCE UR: CLEAR
BACTERIA URNS QL MICRO: NEGATIVE /HPF
BILIRUB UR QL: NEGATIVE
CASTS URNS QL MICRO: ABNORMAL /LPF
COLOR UR: ABNORMAL
EPI CELLS #/AREA URNS HPF: ABNORMAL /HPF
GLUCOSE UR STRIP.AUTO-MCNC: NEGATIVE MG/DL
HCG UR QL: NEGATIVE
HGB UR QL STRIP: NEGATIVE
KETONES UR QL STRIP.AUTO: NEGATIVE MG/DL
LEUKOCYTE ESTERASE UR QL STRIP.AUTO: NEGATIVE
NITRITE UR QL STRIP.AUTO: NEGATIVE
PH UR STRIP: 6.5 [PH] (ref 5–9)
PROT UR STRIP-MCNC: ABNORMAL MG/DL
RBC #/AREA URNS HPF: ABNORMAL /HPF
SP GR UR REFRACTOMETRY: 1.02 (ref 1–1.02)
UROBILINOGEN UR QL STRIP.AUTO: 1 EU/DL (ref 0.2–1)
WBC URNS QL MICRO: ABNORMAL /HPF

## 2022-10-27 PROCEDURE — 3700000001 HC ADD 15 MINUTES (ANESTHESIA): Performed by: UROLOGY

## 2022-10-27 PROCEDURE — 7100000000 HC PACU RECOVERY - FIRST 15 MIN: Performed by: UROLOGY

## 2022-10-27 PROCEDURE — 3600000002 HC SURGERY LEVEL 2 BASE: Performed by: UROLOGY

## 2022-10-27 PROCEDURE — 3600000012 HC SURGERY LEVEL 2 ADDTL 15MIN: Performed by: UROLOGY

## 2022-10-27 PROCEDURE — 2709999900 HC NON-CHARGEABLE SUPPLY: Performed by: UROLOGY

## 2022-10-27 PROCEDURE — 2580000003 HC RX 258: Performed by: STUDENT IN AN ORGANIZED HEALTH CARE EDUCATION/TRAINING PROGRAM

## 2022-10-27 PROCEDURE — 88305 TISSUE EXAM BY PATHOLOGIST: CPT

## 2022-10-27 PROCEDURE — 55700 PR BIOPSY OF PROSTATE,NEEDLE/PUNCH: CPT | Performed by: UROLOGY

## 2022-10-27 PROCEDURE — 76942 ECHO GUIDE FOR BIOPSY: CPT | Performed by: UROLOGY

## 2022-10-27 PROCEDURE — 2500000003 HC RX 250 WO HCPCS: Performed by: NURSE ANESTHETIST, CERTIFIED REGISTERED

## 2022-10-27 PROCEDURE — 2580000003 HC RX 258: Performed by: UROLOGY

## 2022-10-27 PROCEDURE — 81025 URINE PREGNANCY TEST: CPT

## 2022-10-27 PROCEDURE — 81001 URINALYSIS AUTO W/SCOPE: CPT

## 2022-10-27 PROCEDURE — 3700000000 HC ANESTHESIA ATTENDED CARE: Performed by: UROLOGY

## 2022-10-27 PROCEDURE — 6360000002 HC RX W HCPCS: Performed by: UROLOGY

## 2022-10-27 PROCEDURE — 7100000010 HC PHASE II RECOVERY - FIRST 15 MIN: Performed by: UROLOGY

## 2022-10-27 PROCEDURE — 7100000001 HC PACU RECOVERY - ADDTL 15 MIN: Performed by: UROLOGY

## 2022-10-27 PROCEDURE — 6360000002 HC RX W HCPCS: Performed by: NURSE ANESTHETIST, CERTIFIED REGISTERED

## 2022-10-27 RX ORDER — HYDRALAZINE HYDROCHLORIDE 20 MG/ML
10 INJECTION INTRAMUSCULAR; INTRAVENOUS
Status: DISCONTINUED | OUTPATIENT
Start: 2022-10-27 | End: 2022-10-27 | Stop reason: HOSPADM

## 2022-10-27 RX ORDER — HALOPERIDOL 5 MG/ML
1 INJECTION INTRAMUSCULAR
Status: DISCONTINUED | OUTPATIENT
Start: 2022-10-27 | End: 2022-10-27 | Stop reason: HOSPADM

## 2022-10-27 RX ORDER — OXYCODONE HYDROCHLORIDE 5 MG/1
5 TABLET ORAL PRN
Status: DISCONTINUED | OUTPATIENT
Start: 2022-10-27 | End: 2022-10-27 | Stop reason: HOSPADM

## 2022-10-27 RX ORDER — OXYCODONE HYDROCHLORIDE 5 MG/1
10 TABLET ORAL PRN
Status: DISCONTINUED | OUTPATIENT
Start: 2022-10-27 | End: 2022-10-27 | Stop reason: HOSPADM

## 2022-10-27 RX ORDER — IPRATROPIUM BROMIDE AND ALBUTEROL SULFATE 2.5; .5 MG/3ML; MG/3ML
1 SOLUTION RESPIRATORY (INHALATION)
Status: DISCONTINUED | OUTPATIENT
Start: 2022-10-27 | End: 2022-10-27 | Stop reason: HOSPADM

## 2022-10-27 RX ORDER — LABETALOL HYDROCHLORIDE 5 MG/ML
10 INJECTION, SOLUTION INTRAVENOUS
Status: DISCONTINUED | OUTPATIENT
Start: 2022-10-27 | End: 2022-10-27 | Stop reason: HOSPADM

## 2022-10-27 RX ORDER — DIPHENHYDRAMINE HYDROCHLORIDE 50 MG/ML
12.5 INJECTION INTRAMUSCULAR; INTRAVENOUS
Status: DISCONTINUED | OUTPATIENT
Start: 2022-10-27 | End: 2022-10-27 | Stop reason: HOSPADM

## 2022-10-27 RX ORDER — LIDOCAINE HYDROCHLORIDE 10 MG/ML
1 INJECTION, SOLUTION INFILTRATION; PERINEURAL
Status: DISCONTINUED | OUTPATIENT
Start: 2022-10-27 | End: 2022-10-27 | Stop reason: HOSPADM

## 2022-10-27 RX ORDER — MIDAZOLAM HYDROCHLORIDE 2 MG/2ML
2 INJECTION, SOLUTION INTRAMUSCULAR; INTRAVENOUS
Status: DISCONTINUED | OUTPATIENT
Start: 2022-10-27 | End: 2022-10-27 | Stop reason: HOSPADM

## 2022-10-27 RX ORDER — SODIUM CHLORIDE 0.9 % (FLUSH) 0.9 %
5-40 SYRINGE (ML) INJECTION EVERY 12 HOURS SCHEDULED
Status: DISCONTINUED | OUTPATIENT
Start: 2022-10-27 | End: 2022-10-27 | Stop reason: HOSPADM

## 2022-10-27 RX ORDER — PROCHLORPERAZINE EDISYLATE 5 MG/ML
5 INJECTION INTRAMUSCULAR; INTRAVENOUS
Status: DISCONTINUED | OUTPATIENT
Start: 2022-10-27 | End: 2022-10-27 | Stop reason: HOSPADM

## 2022-10-27 RX ORDER — SODIUM CHLORIDE 9 MG/ML
INJECTION, SOLUTION INTRAVENOUS PRN
Status: DISCONTINUED | OUTPATIENT
Start: 2022-10-27 | End: 2022-10-27 | Stop reason: HOSPADM

## 2022-10-27 RX ORDER — HYDROMORPHONE HYDROCHLORIDE 2 MG/ML
0.5 INJECTION, SOLUTION INTRAMUSCULAR; INTRAVENOUS; SUBCUTANEOUS EVERY 5 MIN PRN
Status: DISCONTINUED | OUTPATIENT
Start: 2022-10-27 | End: 2022-10-27 | Stop reason: HOSPADM

## 2022-10-27 RX ORDER — LIDOCAINE HYDROCHLORIDE 20 MG/ML
INJECTION, SOLUTION EPIDURAL; INFILTRATION; INTRACAUDAL; PERINEURAL PRN
Status: DISCONTINUED | OUTPATIENT
Start: 2022-10-27 | End: 2022-10-27 | Stop reason: SDUPTHER

## 2022-10-27 RX ORDER — FENTANYL CITRATE 50 UG/ML
100 INJECTION, SOLUTION INTRAMUSCULAR; INTRAVENOUS
Status: DISCONTINUED | OUTPATIENT
Start: 2022-10-27 | End: 2022-10-27 | Stop reason: HOSPADM

## 2022-10-27 RX ORDER — SODIUM CHLORIDE, SODIUM LACTATE, POTASSIUM CHLORIDE, CALCIUM CHLORIDE 600; 310; 30; 20 MG/100ML; MG/100ML; MG/100ML; MG/100ML
INJECTION, SOLUTION INTRAVENOUS CONTINUOUS
Status: DISCONTINUED | OUTPATIENT
Start: 2022-10-27 | End: 2022-10-27 | Stop reason: HOSPADM

## 2022-10-27 RX ORDER — FENTANYL CITRATE 50 UG/ML
25 INJECTION, SOLUTION INTRAMUSCULAR; INTRAVENOUS
Status: DISCONTINUED | OUTPATIENT
Start: 2022-10-27 | End: 2022-10-27 | Stop reason: HOSPADM

## 2022-10-27 RX ORDER — PROPOFOL 10 MG/ML
INJECTION, EMULSION INTRAVENOUS PRN
Status: DISCONTINUED | OUTPATIENT
Start: 2022-10-27 | End: 2022-10-27 | Stop reason: SDUPTHER

## 2022-10-27 RX ORDER — SODIUM CHLORIDE 0.9 % (FLUSH) 0.9 %
5-40 SYRINGE (ML) INJECTION PRN
Status: DISCONTINUED | OUTPATIENT
Start: 2022-10-27 | End: 2022-10-27 | Stop reason: HOSPADM

## 2022-10-27 RX ADMIN — PROPOFOL 20 MG: 10 INJECTION, EMULSION INTRAVENOUS at 07:46

## 2022-10-27 RX ADMIN — PROPOFOL 30 MG: 10 INJECTION, EMULSION INTRAVENOUS at 07:32

## 2022-10-27 RX ADMIN — CEFTRIAXONE 2000 MG: 2 INJECTION, POWDER, FOR SOLUTION INTRAMUSCULAR; INTRAVENOUS at 07:30

## 2022-10-27 RX ADMIN — PROPOFOL 20 MG: 10 INJECTION, EMULSION INTRAVENOUS at 07:42

## 2022-10-27 RX ADMIN — LIDOCAINE HYDROCHLORIDE 40 MG: 20 INJECTION, SOLUTION EPIDURAL; INFILTRATION; INTRACAUDAL; PERINEURAL at 07:30

## 2022-10-27 RX ADMIN — PROPOFOL 20 MG: 10 INJECTION, EMULSION INTRAVENOUS at 07:38

## 2022-10-27 RX ADMIN — PROPOFOL 50 MG: 10 INJECTION, EMULSION INTRAVENOUS at 07:30

## 2022-10-27 RX ADMIN — SODIUM CHLORIDE, POTASSIUM CHLORIDE, SODIUM LACTATE AND CALCIUM CHLORIDE: 600; 310; 30; 20 INJECTION, SOLUTION INTRAVENOUS at 07:09

## 2022-10-27 RX ADMIN — PROPOFOL 10 MG: 10 INJECTION, EMULSION INTRAVENOUS at 07:36

## 2022-10-27 RX ADMIN — PROPOFOL 20 MG: 10 INJECTION, EMULSION INTRAVENOUS at 07:40

## 2022-10-27 RX ADMIN — PROPOFOL 20 MG: 10 INJECTION, EMULSION INTRAVENOUS at 07:44

## 2022-10-27 RX ADMIN — PROPOFOL 30 MG: 10 INJECTION, EMULSION INTRAVENOUS at 07:34

## 2022-10-27 ASSESSMENT — ENCOUNTER SYMPTOMS
RESPIRATORY NEGATIVE: 1
GASTROINTESTINAL NEGATIVE: 1

## 2022-10-27 ASSESSMENT — PAIN - FUNCTIONAL ASSESSMENT
PAIN_FUNCTIONAL_ASSESSMENT: NONE - DENIES PAIN
PAIN_FUNCTIONAL_ASSESSMENT: 0-10

## 2022-10-27 NOTE — H&P
H&P Update: The patient's last History and Physical was reviewed, and I examined the patient today. There are no changes. The surgical procedure and site were confirmed by the patient and me. PE:  NAD  Heart- Reg, normal perfusion  Pulmonary- clear, normal respiratory effort  Abdomen- Soft, ND     Plan: The risks, benefits, and alternative treatment options were again discussed with the patient. The patient understands and wants to proceed with the procedure--MRI fusion prostate biopsy. [facility]  [address] [address]  [phone]        Mr. Agueda Rai is a 71 y.o. male with a diagnosis of elevated PSA. INTERVAL HISTORY: Patient is here today for follow-up after his prostate MRI. It was performed on 10/3/2022. It showed a 2 cm x 1 cm lesion in the bilateral anterior mid gland down to the apex of the transition zone. It was a PI-RADS 5 lesion. He also had a 1.4 cm lesion in the right anterior mid gland that was a PI-RADS 3 lesion. There was some suspicious enhancement in the distal right seminal vesicle that was concerning as well. There was no pelvic adenopathy or bony lesions noted. He has had no changes in his overall health. His digital rectal exam was normal at the last appointment. His PSA was up to 5.9 when we last saw him. He has no known drug allergies. His only anticoagulant is 81 mg of aspirin daily. From previous note:  Agueda Rai is a 71 y.o. male with Elevated PSA. Mr. Kristie Kidd lives in [place]. They spend a lot of time at the [place] as well. He is  to my 6 . His primary care doctor checked a PSA and noted it was elevated at 4.1. In 12/20/2020 it was 2.2. At the time he also had some lower urinary tract symptoms primarily frequency urgency and nocturia. His urine was cultured and he did not have a UTI.   He was started on Flomax and he states that his symptoms have improved greatly. He has no family history of prostate cancer. His mother had colon cancer and a paternal aunt had breast cancer. 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 He is done well since then. It was treated with chemoradiation. He also has a history of hypertension GERD CAD sleep apnea. Past surgical history includes multiple knee surgeries and a cardiac catheterization. Past medical, family and social histories, as well as medications and allergies, were reviewed and updated in the medical record as appropriate. PMH:     Past Medical History:   Diagnosis Date    Abnormal EKG     Arthritis     osteo    CAD (coronary artery disease)     Cancer ([facility] [address] 2009    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     Chest pain, precordial     Chronic pain     knee    Coronary atherosclerosis of native coronary artery 4/13/2016    Dry mouth     since cancer treatment    GERD (gastroesophageal reflux disease)     controlled w/med    History of pneumonia 2007    Hypercholesteremia      diet controlled    Hypertension     controlled w/med    Malaise and fatigue     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 Negative for chills, fatigue and fever. Respiratory: Negative. Cardiovascular: Negative. Gastrointestinal: Negative. Genitourinary: Negative.   Negative for difficulty urinating, dysuria, flank pain, frequency, hematuria and urgency. Musculoskeletal: Negative. All other systems reviewed and are negative. PHYSICAL EXAMINATION    There were no vitals taken for this visit. General: well dressed, well nourished, no acute distress  Skin: no rashes  HEENT: Sclera are clear,normocephalic, atraumatic. no external lesions   Cardiovascular: Reg. Normal perfusion  Respiratory: normal respiratory effort, no JVD, no audible wheezing. Musculoskeletal: unremarkable with normal function. No embolic signs or cyanosis. Neurologic exam: intact, no focal deficits, moves all 4 extremities  Psych: normal mood and affect, alert, oriented x 3  LE:  no edema  GI: soft, nontender, no masses, no CVA tenderness  : DEFERRED       LABORATORY RESULTS:    Lab Results   Component Value Date/Time    PSA 5.9 08/19/2022 09:37 AM    PSA 2.2 12/28/2020 09:47 AM    PSA 1.8 12/23/2019 02:04 PM         IMAGING:    XR Results:    No results found for this or any previous visit. CT Results:    === 01/25/19 ===    CTA CHEST W WO CONTRAST    - Narrative -  EXAM:  CTA CHEST W OR W WO CONT    INDICATION:  Thoracic aortic aneurysm    COMPARISON:  July 18, 2017    TECHNIQUE:  Multislice helical CT angiography of the chest was performed during uneventful  rapid bolus intravenous administration of contrast. Coronal and sagittal  reformations and MIP images and 3D shaded surface display renderings were  generated. CT dose reduction was achieved through use of a standardized  protocol tailored for this examination and automatic exposure control for dose  modulation. FINDINGS:  Neck base: Normal  Lungs: Small hazy focus in the right upper lobe is not significantly changed. A  few cysts/blebs are present. Mediastinum: Normal.  Cardiac: Normal.  Esophagus: Normal  Upper abdomen: No acute process. Bones: Normal.  CHEST WALL: Normal    Vasculature: Aorta: Mild enlargement of the ascending thoracic aorta measures 4.3 x 4.3 cm,  similar to the previous study.  Mild atherosclerotic changes. There is a bovine  arch. The arch measures 2.9 cm. The descending aorta measures 2.6 cm. Other: Visualized great vessels are patent. Possible proximal right vertebral  artery stenosis again noted. - Impression -  IMPRESSION:Stable ascending aortic enlargement. MRI Results:    === 10/03/22 ===    MRI PROSTATE W WO CONTRAST    - Narrative -  MRI PELVIS WITHOUT AND WITH CONTRAST, 10/3/2022    History: Elevated PSA. Technique: Axial, sagittal, coronal T2; axial diffusion-weighted with calculated  ADC map; axial fat-saturated T1 images were followed by followed by 20 cc  intravenous ProHance, following which multiplanar fat-saturated T1 weighted  imaging to include multiphase axial images were obtained. Findings:  The prostate gland measures: 5.1 cm transverse by 4 cm AP by 4.7 cm  craniocaudal.  The most recent PSA level at this institution is dated 8/19/2022  measured at 5.9 ng/mL. This calculates to a PSA density of 0.118 ng/mL/cc. Peripheral Zone: There is an ill-defined T2 hypointense lesion involving the  right anterior mid gland/apex peripheral zone seen on axial T2-weighted image  16. This demonstrates associated marked signal loss on ADC map images although  only moderate increased signal is seen on high be evaluated diffusion-weighted  images. No clear early focal enhancement is seen at this level. There is an  additional 1.4 cm x 0.9 cm lesion involving the left anterior and posterior base  peripheral zone seen on axial T2-weighted image 9. However, this only  demonstrates mild to moderate associated diffusion-weighted imaging signal  changes, and no early focal enhancement is seen. Central Gland: There is a lentiform lesion occupying the anterior mid gland and  apex bridging the anterior left and right changes in zones measuring 2 cm x 1 cm  in greatest transverse dimensions.   This demonstrates moderate to severe patchy  diffusion-weighted imaging signal changes. In addition, this demonstrates clear  postcontrast enhancement and therefore is not felt to represent benign anterior  fibromuscular stroma. Clinically significant tumor is not excluded. No  distinct Central gland lesion is otherwise seen. Prostate capsule: No suspicious focal contour change, or gross extracapsular  enhancing tumor. Seminal vesicles: Enhancement and diffusion-weighted imaging signal changes are  seen in the more distal right seminal vesicle best appreciated on  diffusion-weighted images 5 and axial postcontrast image 53. The appearance  raises concern for involvement of the distal right seminal vesicle by tumor. Neurovascular bundles: No clear evidence for involvement. Lymph nodes: No pelvic lymphadenopathy. Bones: No suspicious enhancing osseous lesion. Other:    - Impression -  1. Peripheral zone changes as follows:  -1.4 cm x 0.9 cm lesion in the right anterior mid gland/apex peripheral zone:  PIRADS 3    2. Central gland changes as follows:  -2 cm x 1 cm lesion involving the bilateral anterior mid gland and apex  transition zone: PIRADS 5    3. Suspicious enhancement in diffusion-weighted imaging signal changes in the  distal right seminal vesicle concerning for involvement of the distal right  seminal vesicle by potential prostate tumor. 4.  No findings concerning for metastatic lesions in the visualized pelvis. ASSESSMENT:    Mr. Danna Mckeon is a 71 y.o. male with a diagnosis of elevated PSA. I am concerned about some of the findings on his MRI and I recommended he move forward with the MRI fusion guided biopsy of the prostate. We discussed the risks and alternatives of that procedure as outlined below. I have asked him to stop his aspirin approximately a week ahead of time. We discussed the risks of bleeding and infection. We will plan to give him 2 tablets of Cipro to take the day of the procedure.   We will plan to give him Rocephin intraoperatively. We reviewed with the patient the significance of an elevated PSA. We discussed benign etiologies such as; benign enlargement of the prostate, inflammation of the prostate, infections of the prostate, and prostatic manipulation as a possible cause. We also discussed the possibility of prostate cancer. The only way to really differentiate this would be a biopsy of the prostate. The most common way to diagnose prostate cancer is an ultrasound-guided biopsy of the prostate with our without MRI fusion. Alternatively, we could repeat the PSA, do a free/total PSA, or just manage the patient with observation. We discussed the risks inherent in a biopsy including but not limited to; bleeding, infection, urinary retention, and pain associated with the procedure. We recommend the patient stop any aspirin or nonsteroidal anti-inflammatories or other blood thinners approximately 5 days prior to the procedure. If the patient is taking any of these medications for heart issues or other specific reasons, he needs to consult with the prescribing doctor before stopping treatment. We explained that we give him prophylactic antibiotic(s) around the time of the procedure to help prevent serious infectious complications (which have been on the rise). We also discussed concerns with over-treatment of prostate cancer and that the main purpose of the biopsy is to hopefully rule out high-grade prostate cancer. After a full discussion regarding the potential risks, benefits, and treatment alternatives, the patient has decided to proceed with a prostate biopsy. PLAN:   -review MRI PELV  -rec'd TRUS fusion prostate bx. R/B's reviewed, printout given  -cipro 500mg x2 tablets sent to pharmacy  -rtc after bx for pathology     ________________________________________      I have seen and examined this patient. I have reviewed and edited the note started by the MA and agree with the outlined plan.   Part of this note was written by using a voice dictation software. The note has been proof read but may still contain some grammatical/other typographical errors.       [facility]

## 2022-10-27 NOTE — DISCHARGE INSTRUCTIONS
My office will schedule your follow-up appointment. Please call our office ([phone]) or return to ED if you experience fever > 101.5, severe pain, persistent nausea/vomiting, excessive bleeding, inability to urinate, or other concerns. Please take your cipro tablet tonight. Prostate Biopsy and Ultrasound:   A prostate biopsy is a type of test. Your doctor takes small tissue samples from your prostate gland. Then another doctor looks at the tissue under a microscope to see if there are cancer cells. This test is done by a doctor who specializes in men's genital and urinary problems (urologist). It can be done in your doctor's office, a day surgery clinic, or a hospital operating room. To get the tissue samples from the prostate, the doctor inserts a thin needle through the rectum, the urethra, or the area between the anus and scrotum (perineum). The most common method is through the rectum. Your doctor may use ultrasound to help guide the needle. What else should you know about this test?  A prostate biopsy has a slight risk of causing problems such as infection or bleeding. If the biopsy went through your rectum, you may have a small amount of bleeding from your rectum for 2 to 3 days after the biopsy. You may have a little pain in your pelvic area. You may also have a little blood in your urine for 1 to 5 days. You may have some blood in your semen for a week or longer. Do not do heavy work or exercise for 4 hours after the test.  Your doctor will tell you how long it may take to get your results back. Follow-up care is a key part of your treatment and safety. Be sure to make and go to all appointments, and call your doctor if you are having problems. It's also a good idea to keep a list of the medicines you take. Ask your doctor when you can expect to have your test results.     After general anesthesia or intravenous sedation, for 24 hours or while taking prescription Narcotics:  Limit your activities  A responsible adult needs to be with you for the next 24 hours  Do not drive and operate hazardous machinery  Do not make important personal or business decisions  Do not drink alcoholic beverages  If you have not urinated within 8 hours after discharge, and you are experiencing discomfort from urinary retention, please go to the nearest ED. If you have sleep apnea and have a CPAP machine, please use it for all naps and sleeping. Please use caution when taking narcotics and any of your home medications that may cause drowsiness. *  Please give a list of your current medications to your Primary Care Provider. *  Please update this list whenever your medications are discontinued, doses are      changed, or new medications (including over-the-counter products) are added. *  Please carry medication information at all times in case of emergency situations. These are general instructions for a healthy lifestyle:  No smoking/ No tobacco products/ Avoid exposure to second hand smoke  Surgeon General's Warning:  Quitting smoking now greatly reduces serious risk to your health. Obesity, smoking, and sedentary lifestyle greatly increases your risk for illness  A healthy diet, regular physical exercise & weight monitoring are important for maintaining a healthy lifestyle    You may be retaining fluid if you have a history of heart failure or if you experience any of the following symptoms:  Weight gain of 3 pounds or more overnight or 5 pounds in a week, increased swelling in our hands or feet or shortness of breath while lying flat in bed. Please call your doctor as soon as you notice any of these symptoms; do not wait until your next office visit.

## 2022-10-27 NOTE — OP NOTE
Operative Note        Patient: Oscar Fink, [id number]    Date of Surgery: 10/27/22    Preoperative Diagnosis: Elevated PSA    Postoperative Diagnosis:  same    Surgeon(s) and Role:     * Domo Urbina MD - Primary     Anesthesia:  MAC     Procedure: Procedure(s):  Jacky Powers MRI fused TRUS prostate biopsy     Indications:     Discussed the risk of surgery including infection, hematoma, bleeding, urinary retention, and the risks of anesthethesia. The patient understands the risks, any and all questions were answered to the patient's satisfaction and signed the consent for operation. URONAV MRI FUSED TRANSRECTAL ULTRASOUND GUIDED BIOPSY OF THE PROSTATE    All risks, benefits and alternatives were again reviewed and he is willing to proceed at this time. The patient was placed in the left lateral decubitus position. Rocephin was given as a prophylactic antibiotic. A betadine swab was used to prep the anus/rectum. I then inserted the transrectal ultrasound probe into the rectum. The prostate was visualized. The prostate appeared homogenous in appearance. Ultrasonographic sweep of the prostate was performed to obtain images to link to MRI via URONAV. There were 3 regions of interest based upon MRI imaging. 2 biopsies of regions of interest were then obtained using the ultrasound images for guidance that had been linked to the previous MRI using Uronav. I then performed 12 needle core biopsies using a standard sextant biopsy format with traditional ultrasound images for guidance. The ultrasound probe was removed. The patient tolerated the procedure well. PROSTATE VOLUME:  43 cc    SONIDO: bilateral induration R>L    Estimated Blood Loss:  minimal    Specimens: prostate biopsies             Drains: none                 Implants: * No implants in log *    Jenney Sicard, M.D.     [facility]  [address] [place] [address]

## 2022-10-27 NOTE — ANESTHESIA PRE PROCEDURE
IntraVENous Continuous Luisa Field  mL/hr at 10/27/22 0724 NoRateChange at 10/27/22 0724    sodium chloride flush 0.9 % injection 5-40 mL  5-40 mL IntraVENous 2 times per day Luisa Field MD        sodium chloride flush 0.9 % injection 5-40 mL  5-40 mL IntraVENous PRN Luisa Field MD        0.9 % sodium chloride infusion   IntraVENous PRN Luisa Field MD        midazolam PF (VERSED) injection 2 mg  2 mg IntraVENous Once PRN Luisa Field MD        cefTRIAXone (ROCEPHIN) 2,000 mg in sodium chloride 0.9 % 50 mL IVPB mini-bag  2,000 mg IntraVENous On Call to [id number] Cathryn Stevens MD   Held at 10/27/22 0710       Allergies:     Allergies   Allergen Reactions    Rosuvastatin Myalgia       Problem List:    Patient Active Problem List   Diagnosis Code    History of pneumonia Z87.01    Hypercholesteremia E78.00    Class 1 obesity due to excess calories with serious comorbidity and body mass index (BMI) of 30.0 to 30.9 in adult E66.09, Z68.30    Sleep apnea G47.30    Atrial tachycardia, paroxysmal (HCC) I47.1    Thoracic aortic aneurysm without rupture I71.20    Hypertension I10    History of throat cancer Z85.819    Neck pain M54.2    Atherosclerosis of native coronary artery of native heart without angina pectoris I25.10    Nocturia R35.1    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    Seasonal allergic rhinitis J30.2    GERD (gastroesophageal reflux disease) K21.9    Status post total left knee replacement Z96.652    Bilateral carotid artery disease (HCC) I77.9    Asbestosis ([address] J61    Elevated prostate specific antigen (PSA) R97.20       Past Medical History:        Diagnosis Date    Abnormal EKG     Arthritis     osteo    Asbestosis ([address]     CAD (coronary artery disease)     Cancer ([address] 2009    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     Chest pain, precordial     Chronic pain     knee    Coronary atherosclerosis of native coronary artery 4/13/2016    Dry mouth     since cancer treatment    GERD (gastroesophageal reflux disease)     controlled w/med    History of pneumonia 2007    Hypercholesteremia      diet controlled    Hypertension     controlled w/med    Malaise and fatigue     S/P total knee replacement using cement 11/25/2013    Shingles 2007    Sleep apnea     does not use or have CPAP anymore - sleeping good since 30-35 weight loss 2011 per pt    Status post total left knee replacement 12/30/2015       Past Surgical History:        Procedure Laterality Date    CARDIAC CATHETERIZATION      COLONOSCOPY  1/04    Dr. Wendy Perez with normal findings    COLONOSCOPY  01/14/2019    Dr. Sapphire Mireles; diverticulosis and 2 fragments of tubular adenomas    KNEE ARTHROSCOPY Left 1990    SHOULDER ARTHROSCOPY Right     TOTAL KNEE ARTHROPLASTY Left 12/30/2015    Dr. Laly House Bilateral 2013, 2014    total    TOTAL KNEE ARTHROPLASTY Bilateral 2005    Partial -        Social History:    Social History     Tobacco Use    Smoking status: Never    Smokeless tobacco: Never   Substance Use Topics    Alcohol use:  Yes     Alcohol/week: 16.0 standard drinks     Comment: occasional                                Counseling given: Not Answered      Vital Signs (Current):   Vitals:    10/24/22 1618 10/27/22 0636   BP:  (!) 171/102   Pulse:  77   Resp:  16   Temp:  98.1 °F (36.7 °C)   TempSrc:  Oral   SpO2:  97%   Weight: 210 lb (95.3 kg) 214 lb (97.1 kg)   Height: 6' (1.829 m) 5' 11.5\" (1.816 m)                                              BP Readings from Last 3 Encounters:   10/27/22 (!) 171/102   10/07/22 (!) 144/90   10/04/22 134/88       NPO Status: Time of last liquid consumption: 1930                        Time of last solid consumption: 1930                        Date of last liquid consumption: 10/26/22                        Date of last solid food consumption: 10/26/22    BMI:   Wt Readings from Last 3 Encounters:   10/27/22 214 lb (97.1 kg)   10/07/22 207 lb (93.9 kg)   10/04/22 215 lb (97.5 kg)     Body mass index is 29.43 kg/m². CBC:   Lab Results   Component Value Date/Time    WBC 5.9 12/28/2020 09:47 AM    RBC 5.42 12/28/2020 09:47 AM    HGB 16.2 12/28/2020 09:47 AM    HCT 47.1 12/28/2020 09:47 AM    MCV 87 12/28/2020 09:47 AM    RDW 12.8 12/28/2020 09:47 AM     12/28/2020 09:47 AM       CMP:   Lab Results   Component Value Date/Time     12/28/2020 09:47 AM    K 4.3 12/28/2020 09:47 AM     12/28/2020 09:47 AM    CO2 22 12/28/2020 09:47 AM    BUN 14 12/28/2020 09:47 AM    CREATININE 0.91 12/28/2020 09:47 AM    GFRAA 99 12/28/2020 09:47 AM    AGRATIO 2.3 12/28/2020 09:47 AM    GLUCOSE 99 12/28/2020 09:47 AM    PROT 6.2 12/28/2020 09:47 AM    CALCIUM 8.9 12/28/2020 09:47 AM    BILITOT 0.6 12/28/2020 09:47 AM    ALKPHOS 57 12/28/2020 09:47 AM    AST 24 12/28/2020 09:47 AM    ALT 17 03/30/2021 10:16 AM       POC Tests: No results for input(s): POCGLU, POCNA, POCK, POCCL, POCBUN, POCHEMO, POCHCT in the last 72 hours.     Coags: No results found for: PROTIME, INR, APTT    HCG (If Applicable):   Lab Results   Component Value Date    PREGTESTUR Negative 10/27/2022        ABGs: No results found for: PHART, PO2ART, VAX5MXU, MII6URL, BEART, K3OPPQTG     Type & Screen (If Applicable):  No results found for: LABABO, LABRH    Drug/Infectious Status (If Applicable):  No results found for: HIV, HEPCAB    COVID-19 Screening (If Applicable): No results found for: COVID19        Anesthesia Evaluation  Patient summary reviewed and Nursing notes reviewed no history of anesthetic complications:   Airway: Mallampati: II  TM distance: >3 FB   Neck ROM: limited  Mouth opening: > = 3 FB   Dental:    (+) partials      Pulmonary:normal exam                              ROS comment: asbestosis   Cardiovascular:  Exercise tolerance: good (>4 METS),   (+) hypertension:, dysrhythmias (a tach): PVC, hyperlipidemia ROS comment: TTE 2021: normal lvef, no rwma    mild CAD 1/15 LHC after pos NST     Jan, 2017 Carotid US showed mod ALLIE stenosis. CT 7/2017 and 1/2019: Ao 4.4cm. Monitor with a tach, PVCs. Neuro/Psych:   Negative Neuro/Psych ROS              GI/Hepatic/Renal:   (+) GERD: well controlled,           Endo/Other:    (+) malignancy/cancer (hx neck radiation. throat/base of tongue). Abdominal:             Vascular: negative vascular ROS. Other Findings:           Anesthesia Plan      TIVA     ASA 3       Induction: intravenous. Anesthetic plan and risks discussed with patient.                         Jessy Prieto MD   10/27/2022

## 2022-10-28 ENCOUNTER — OFFICE VISIT (OUTPATIENT)
Dept: VASCULAR SURGERY | Age: 72
End: 2022-10-28
Payer: MEDICARE

## 2022-10-28 VITALS
OXYGEN SATURATION: 98 % | WEIGHT: 214 LBS | BODY MASS INDEX: 29.96 KG/M2 | HEART RATE: 80 BPM | HEIGHT: 71 IN | SYSTOLIC BLOOD PRESSURE: 141 MMHG | DIASTOLIC BLOOD PRESSURE: 83 MMHG | TEMPERATURE: 98.1 F

## 2022-10-28 DIAGNOSIS — I71.21 ANEURYSM OF ASCENDING AORTA WITHOUT RUPTURE: ICD-10-CM

## 2022-10-28 DIAGNOSIS — I73.9 PVD (PERIPHERAL VASCULAR DISEASE) WITH CLAUDICATION (HCC): Primary | ICD-10-CM

## 2022-10-28 PROCEDURE — G8417 CALC BMI ABV UP PARAM F/U: HCPCS | Performed by: SURGERY

## 2022-10-28 PROCEDURE — G8484 FLU IMMUNIZE NO ADMIN: HCPCS | Performed by: SURGERY

## 2022-10-28 PROCEDURE — 99213 OFFICE O/P EST LOW 20 MIN: CPT | Performed by: SURGERY

## 2022-10-28 PROCEDURE — 1036F TOBACCO NON-USER: CPT | Performed by: SURGERY

## 2022-10-28 PROCEDURE — 1123F ACP DISCUSS/DSCN MKR DOCD: CPT | Performed by: SURGERY

## 2022-10-28 PROCEDURE — 3078F DIAST BP <80 MM HG: CPT | Performed by: SURGERY

## 2022-10-28 PROCEDURE — 3074F SYST BP LT 130 MM HG: CPT | Performed by: SURGERY

## 2022-10-28 PROCEDURE — 3017F COLORECTAL CA SCREEN DOC REV: CPT | Performed by: SURGERY

## 2022-10-28 PROCEDURE — G8427 DOCREV CUR MEDS BY ELIG CLIN: HCPCS | Performed by: SURGERY

## 2022-10-28 ASSESSMENT — PATIENT HEALTH QUESTIONNAIRE - PHQ9
2. FEELING DOWN, DEPRESSED OR HOPELESS: 0
SUM OF ALL RESPONSES TO PHQ9 QUESTIONS 1 & 2: 0
SUM OF ALL RESPONSES TO PHQ QUESTIONS 1-9: 0
1. LITTLE INTEREST OR PLEASURE IN DOING THINGS: 0
SUM OF ALL RESPONSES TO PHQ QUESTIONS 1-9: 0

## 2022-10-28 NOTE — PROGRESS NOTES
[address]   [address] [facility] FAX: [phone]    Maria Dolores Garcia  : 1950    Chief Complaint:     History of Present Illness:   Patient follows up today for follow-up for carotid surveillance. Patient denies any strokelike symptoms. CURRENT MEDICATIONS:  Current Outpatient Medications   Medication Sig Dispense Refill    Cholecalciferol (VITAMIN D3) 50 MCG ( UT) CAPS Take by mouth daily      ciprofloxacin (CIPRO) 500 MG tablet Take one tablet the morning of the procedure. Take the second tablet after the procedure prior to going to bed. 2 tablet 0    omeprazole (PRILOSEC) 20 MG delayed release capsule Take 1 capsule by mouth every morning (before breakfast) (Patient taking differently: Take 20 mg by mouth as needed) 90 capsule 1    MAGNESIUM CARBONATE PO Take by mouth daily      POTASSIUM CITRATE PO Take by mouth daily      ZINC PO Take by mouth      ascorbic acid (VITAMIN C) 250 MG tablet Take 250 mg by mouth daily      aspirin 81 MG EC tablet Take by mouth daily      atorvastatin (LIPITOR) 10 MG tablet Take 10 mg by mouth at bedtime      loratadine (CLARITIN) 10 MG tablet Take 10 mg by mouth as needed       No current facility-administered medications for this visit.        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     Chest pain, precordial     Chronic pain     knee    Coronary atherosclerosis of native coronary artery 2016    Dry mouth     since cancer treatment    GERD (gastroesophageal reflux disease)     controlled w/med    History of pneumonia 2007    Hypercholesteremia      diet controlled    Hypertension     controlled w/med    Malaise and fatigue     S/P total knee replacement using cement 2013    Shingles 2007    Sleep apnea     does not use or have CPAP anymore - sleeping good since 30-35 weight loss 2011 per pt    Status post total left knee replacement 12/30/2015       Physical Examination:   Height: 5' 11\" (1.803 m), Weight: 214 lb (97.1 kg), BP: (!) 141/83, Pain 0-10: Pain Level: 0;     Constitutional: he appears well-developed. No distress. HENT:   Head: Atraumatic. Eyes: Pupils are equal, round, and reactive to light. Neck: Normal range of motion. Cardiovascular: Regular rhythm. Pulmonary/Chest: Effort normal and breath sounds normal. No respiratory distress. Abdominal: Soft. Bowel sounds are normal. he exhibits no distension. There is no tenderness. There is no guarding. No hernia. Musculoskeletal: Normal range of motion. Neurological: He is alert. CN II- XII grossly intact   Vascular: No carotid bruits    Imaging:  Right carotid 124 cm sec end-diastolic 30 cm sec indicative less than 50%  Left carotid artery 57 seconds less than 50%        Recommendations/Plans:   Mr. Justo Luo is a 71y.o. year old male asymptomatic bilateral carotid stenosis unchanged. Follow-up in 1 year with carotid duplex study. Patient also has a history of a 4.3 cm ascending aortic aneurysm 3 years ago. I will order CTA of the chest and abdomen and refer him over to cardiology's/CT surgery for results as ascending aneurysms are not repaired by vascular surgeon. Luis Eduardo Tabares MD    Elements of this note have been dictated using speech recognition software. As a result, errors of speech recognition may have occurred.     20 minutes of time was spent on this encounter including chart review, assessment, evaluation and coordination of patient care

## 2022-11-03 NOTE — PROGRESS NOTES
[address] [facility]  [address] [facility], [address] [place] [address]  [phone]        Mr. Leny Sprague is a 71 y.o. male with a diagnosis of Prostate Cancer. S/p TRUS fusion prostate bx 10/27/22, + for prostate cancer,  GS 4+3=7, cT2, PSA 5.9. INTERVAL HISTORY: Patient is here today for follow-up after his MRI fusion prostate biopsy on 10/27/2022. He was found to have Jasper 4+3 in a total of 5 cores including region of interest #3. He also had Yadi 3+3 equal 6 in 2 additional cores. His initial PSA was 5.9. His digital rectal exam showed bilateral induration, right greater than left. His prostate measured 43 cc. His prostate MRI showed a 2 cm PI-RADS 5 lesion as well as a PI-RADS 3 lesion. He is quite fit for 71year-old. 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 He currently walks 2 to 3 miles daily. He has no prior abdominal surgical history. His father lived to age 80. His mother is still alive at age 80. His IPSS symptom score today is 14. He is mostly satisfied from a quality-of-life standpoint. His evan score is 18. From previous note:  Patient is here today for follow-up after his prostate MRI. It was performed on 10/3/2022. It showed a 2 cm x 1 cm lesion in the bilateral anterior mid gland down to the apex of the transition zone. It was a PI-RADS 5 lesion. He also had a 1.4 cm lesion in the right anterior mid gland that was a PI-RADS 3 lesion. There was some suspicious enhancement in the distal right seminal vesicle that was concerning as well. There was no pelvic adenopathy or bony lesions noted. He has had no changes in his overall health. His digital rectal exam was normal at the last appointment. His PSA was up to 5.9 when we last saw him. He has no known drug allergies. His only anticoagulant is 81 mg of aspirin daily.      From previous note:  Jessica Ibarra Sydni Means is a 71 y.o. male with Elevated PSA. Mr. Sydni Means lives in [place]. They spend a lot of time at the beach as well. He is  to my 6 . His primary care doctor checked a PSA and noted it was elevated at 4.1. In 12/20/2020 it was 2.2. At the time he also had some lower urinary tract symptoms primarily frequency urgency and nocturia. His urine was cultured and he did not have a UTI. He was started on Flomax and he states that his symptoms have improved greatly. He has no family history of prostate cancer. His mother had colon cancer and a paternal aunt had breast cancer. 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 He is done well since then. It was treated with chemoradiation. He also has a history of hypertension GERD CAD sleep apnea. Past surgical history includes multiple knee surgeries and a cardiac catheterization. Past medical, family and social histories, as well as medications and allergies, were reviewed and updated in the medical record as appropriate.     PMH:     Past Medical History:   Diagnosis Date    Abnormal EKG     Arthritis     osteo    Asbestosis ([facility] [address]     CAD (coronary artery disease)     Cancer ([facility] [address] 2009    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     Chest pain, precordial     Chronic pain     knee    Coronary atherosclerosis of native coronary artery 4/13/2016    Dry mouth     since cancer treatment    GERD (gastroesophageal reflux disease)     controlled w/med    History of pneumonia 2007    Hypercholesteremia      diet controlled    Hypertension     controlled w/med    Malaise and fatigue     S/P total knee replacement using cement 11/25/2013    Shingles 2007    Sleep apnea     does not use or have CPAP anymore - sleeping good since 30-35 weight loss 2011 per pt    Status post total left knee replacement 12/30/2015       MEDs:     ascorbic acid  aspirin  atorvastatin  ciprofloxacin  loratadine  MAGNESIUM CARBONATE PO  omeprazole  POTASSIUM CITRATE PO  Vitamin D3 Caps  ZINC PO     ALLERGIES:    Allergies   Allergen Reactions    Rosuvastatin Myalgia       ROS:     Review of Systems   Constitutional: Negative. Negative for chills, fatigue and fever. Respiratory: Negative. Cardiovascular: Negative. Gastrointestinal: Negative. Genitourinary: Negative. Negative for difficulty urinating, dysuria, flank pain, frequency, hematuria and urgency. Musculoskeletal: Negative. All other systems reviewed and are negative. PHYSICAL EXAMINATION    /80 (Site: Right Upper Arm, Position: Standing)   Pulse 92   Temp 98.5 °F (36.9 °C)   Resp 12   Ht 5' 11\" (1.803 m)   Wt 215 lb 6.4 oz (97.7 kg)   SpO2 97%   BMI 30.04 kg/m²   General: well dressed, well nourished, no acute distress  Skin: no rashes  HEENT: Sclera are clear,normocephalic, atraumatic. no external lesions   Cardiovascular: Reg. Normal perfusion  Respiratory: normal respiratory effort, no JVD, no audible wheezing. Musculoskeletal: unremarkable with normal function. No embolic signs or cyanosis.    Neurologic exam: intact, no focal deficits, moves all 4 extremities  Psych: normal mood and affect, alert, oriented x 3  LE:  no edema  GI: soft, nontender, no masses, no CVA tenderness  : DEFERRED       LABORATORY RESULTS:    Lab Results   Component Value Date/Time    PSA 5.9 08/19/2022 09:37 AM    PSA 2.2 12/28/2020 09:47 AM    PSA 1.8 12/23/2019 02:04 PM          IMAGING:      CT Results:    === 01/25/19 ===    CTA CHEST W WO CONTRAST    - Narrative -  EXAM:  CTA CHEST W OR W WO CONT    INDICATION:  Thoracic aortic aneurysm    COMPARISON:  July 18, 2017    TECHNIQUE:  Multislice helical CT angiography of the chest was performed during uneventful  rapid bolus intravenous administration of contrast. Coronal and sagittal  reformations and MIP images and 3D shaded surface display renderings were  generated. CT dose reduction was achieved through use of a standardized  protocol tailored for this examination and automatic exposure control for dose  modulation. FINDINGS:  Neck base: Normal  Lungs: Small hazy focus in the right upper lobe is not significantly changed. A  few cysts/blebs are present. Mediastinum: Normal.  Cardiac: Normal.  Esophagus: Normal  Upper abdomen: No acute process. Bones: Normal.  CHEST WALL: Normal    Vasculature: Aorta: Mild enlargement of the ascending thoracic aorta measures 4.3 x 4.3 cm,  similar to the previous study. Mild atherosclerotic changes. There is a bovine  arch. The arch measures 2.9 cm. The descending aorta measures 2.6 cm. Other: Visualized great vessels are patent. Possible proximal right vertebral  artery stenosis again noted. - Impression -  IMPRESSION:Stable ascending aortic enlargement. MRI Results:    === 10/03/22 ===    MRI PROSTATE W WO CONTRAST    - Narrative -  MRI PELVIS WITHOUT AND WITH CONTRAST, 10/3/2022    History: Elevated PSA. Technique: Axial, sagittal, coronal T2; axial diffusion-weighted with calculated  ADC map; axial fat-saturated T1 images were followed by followed by 20 cc  intravenous ProHance, following which multiplanar fat-saturated T1 weighted  imaging to include multiphase axial images were obtained. Findings:  The prostate gland measures: 5.1 cm transverse by 4 cm AP by 4.7 cm  craniocaudal.  The most recent PSA level at this institution is dated 8/19/2022  measured at 5.9 ng/mL. This calculates to a PSA density of 0.118 ng/mL/cc. Peripheral Zone: There is an ill-defined T2 hypointense lesion involving the  right anterior mid gland/apex peripheral zone seen on axial T2-weighted image  16. This demonstrates associated marked signal loss on ADC map images although  only moderate increased signal is seen on high be evaluated diffusion-weighted  images.   No clear early focal enhancement is seen at this level. There is an  additional 1.4 cm x 0.9 cm lesion involving the left anterior and posterior base  peripheral zone seen on axial T2-weighted image 9. However, this only  demonstrates mild to moderate associated diffusion-weighted imaging signal  changes, and no early focal enhancement is seen. Central Gland: There is a lentiform lesion occupying the anterior mid gland and  apex bridging the anterior left and right changes in zones measuring 2 cm x 1 cm  in greatest transverse dimensions. This demonstrates moderate to severe patchy  diffusion-weighted imaging signal changes. In addition, this demonstrates clear  postcontrast enhancement and therefore is not felt to represent benign anterior  fibromuscular stroma. Clinically significant tumor is not excluded. No  distinct Central gland lesion is otherwise seen. Prostate capsule: No suspicious focal contour change, or gross extracapsular  enhancing tumor. Seminal vesicles: Enhancement and diffusion-weighted imaging signal changes are  seen in the more distal right seminal vesicle best appreciated on  diffusion-weighted images 5 and axial postcontrast image 53. The appearance  raises concern for involvement of the distal right seminal vesicle by tumor. Neurovascular bundles: No clear evidence for involvement. Lymph nodes: No pelvic lymphadenopathy. Bones: No suspicious enhancing osseous lesion. Other:    - Impression -  1. Peripheral zone changes as follows:  -1.4 cm x 0.9 cm lesion in the right anterior mid gland/apex peripheral zone:  PIRADS 3    2. Central gland changes as follows:  -2 cm x 1 cm lesion involving the bilateral anterior mid gland and apex  transition zone: PIRADS 5    3. Suspicious enhancement in diffusion-weighted imaging signal changes in the  distal right seminal vesicle concerning for involvement of the distal right  seminal vesicle by potential prostate tumor.     4.  No findings concerning for metastatic lesions in the visualized pelvis. ASSESSMENT:    Mr. Fransico Robledo is a 71 y.o. male with a new diagnosis of NCCN unfavorable intermediate risk prostate cancer. He has Clinton 4+3 equal 7. His PSA was 5.9. His digital rectal exam was clinical stage T2. He has a PI-RADS 5 to centimeter lesion on his MRI. There was no evidence of pelvic adenopathy. There was questionable involvement of the right seminal vesicle on his MRI. I discussed that typically we order technetium bone scans for patient patients with this risk category prostate cancer, but we are not able to order those right now because of a severe shortage of technetium. I think with his low PSA it is unlikely he has any metastatic bone disease. We had a long discussion about treatment options. I discussed watchful waiting, active surveillance, robotic radical prostatectomy with bilateral pelvic lymph node dissection, and radiation therapy options. He is already quite well-informed on his treatment options and does not wish to move forward with radiation. I did offer radiation oncology referral if wanted. At this point he would like to move forward with robotic radical prostatectomy and bilateral pelvic lymph node dissection. We discussed the risks of that in detail as outlined below. He does have a trip planned with his family to [facility] in March over spring break which he would like to be able to attend. After a full discussion with the patient regarding the natural history of localized prostate cancer, a review of his records including his biopsy pathology report and entire clinical picture, we discussed in detail the treatment options for localized prostate cancer. We discussed active surveillance and radical prostatectomy, as well as both forms of radiation therapy (external beam and brachytherapy). The pros and cons as well as side-effects and potential outcomes of the various treatment options were discussed.   We spent a substantial amount of time talking about the risks of the surgery as well as the potential complications. There is a < 1% mortality rate associated with the surgery and about a 5% risk of major complications including but not limited to stroke, myocardial infarction, and pulmonary embolus. We discussed other risks and complications of surgery including but not limited to; infection, bleeding, the possible need for a blood transfusion, damage to adjacent organs (especially vessels, nerves rectum/bowel, and ureters), need for excision of involved or injured organs/structures, fistula formation, cancer recurrence, possibility of benign pathology, possibility of renal or pulmonary or other organ failure. There is approximately a 5% risk of transfusion for this type of procedure. We talked about sexual function (infertility) and erectile dysfunction following the operation. The patient understands that this surgery will most likely impact his erections and urinary function. There is at a 100% chance of worsening of erectile function after surgery. A need for permanent assistance with sexual function is a possibility. With respect to urinary control, about 5%-10% of men end up with bothersome incontinence beyond six months after the surgery to the point of requiring a secondary incontinence procedure. We discussed stress-related incontinence, which is common and is found in at least a 1/4 to 1/3 of men. It is generally minor and associated with increasing abdominal pressure and straining. We informed the patient that some men have improvements in obstructive urinary symptoms after the surgery. Patient understands urinary incontinence and erectile dysfunction are potential side effects of this procedure.       We talked about the logistics of surgery; the need for a catheter to be present for approximately 10 days, a one day hospital stay, no driving for two weeks, and no heavy lifting (nothing over 10lbs) for six weeks. We explained the need for postoperative follow-up which would be initially a check of his pathology and a PSA at about six weeks, followed by three-six month interval PSAs. Adjuvant treatment such as radiation or additional hormonal manipulations could be required if the cancer were to recur, but that would be based on the timing and type of recurrence. After a discussion of all the above, patient is motivated towards treatment with curative intent. He understands the risks and benefits of surgery, the alternative treatment options and wishes to proceed. Accordingly, we are going to schedule him for a robotic radical prostatectomy (RALP with bilateral pelvic lymph node dissection). We plan to have him see our anesthesia team preoperatively. After a full discussion with the patient regarding the natural history of localized prostate cancer, a review of his records including his biopsy pathology report and entire clinical picture, we discussed in detail the treatment options for localized prostate cancer. We discussed active surveillance and radical prostatectomy, as well as both forms of radiation therapy (external beam and brachytherapy). The pros and cons as well as side-effects and potential outcomes of the various treatment options were discussed. We spent a substantial amount of time talking about the risks of the surgery as well as the potential complications. There is a < 1% mortality rate associated with the surgery and about a 5% risk of major complications including but not limited to stroke, myocardial infarction, and pulmonary embolus.  We discussed other risks and complications of surgery including but not limited to; infection, bleeding, the possible need for a blood transfusion, damage to adjacent organs (especially vessels, nerves rectum/bowel, and ureters), need for excision of involved or injured organs/structures, fistula formation, cancer recurrence, possibility of benign pathology, possibility of renal or pulmonary or other organ failure. There is approximately a 5% risk of transfusion for this type of procedure. We talked about sexual function (infertility) and erectile dysfunction following the operation. The patient understands that this surgery will most likely impact his erections and urinary function. There is at a 100% chance of worsening of erectile function after surgery. A need for permanent assistance with sexual function is a possibility. With respect to urinary control, about 5%-10% of men end up with bothersome incontinence beyond six months after the surgery to the point of requiring a secondary incontinence procedure. We discussed stress-related incontinence, which is common and is found in at least a 1/4 to 1/3 of men. It is generally minor and associated with increasing abdominal pressure and straining. We informed the patient that some men have improvements in obstructive urinary symptoms after the surgery. Patient understands urinary incontinence and erectile dysfunction are potential side effects of this procedure. We talked about the logistics of surgery; the need for a catheter to be present for approximately 10 days, a one day hospital stay, no driving for two weeks, and no heavy lifting (nothing over 10lbs) for six weeks. We explained the need for postoperative follow-up which would be initially a check of his pathology and a PSA at about six weeks, followed by three-six month interval PSAs. Adjuvant treatment such as radiation or additional hormonal manipulations could be required if the cancer were to recur, but that would be based on the timing and type of recurrence. After a discussion of all the above, patient is motivated towards treatment with curative intent. He understands the risks and benefits of surgery, the alternative treatment options and wishes to proceed.   Accordingly, we are going to schedule him for a robotic radical prostatectomy (RALP with bilateral pelvic lymph node dissection). We plan to have him see our anesthesia team preoperatively. PLAN:   -review pathology  -AUA and MARLENE  -rec'd RALP. R/B's reviewed, printout given  -pt will need pre op anesthesia, and PFT. -To OR for RALP with BPLND in ~2weeks. -PCa information given in AVS  -referral to rad onc discussed. ________________________________________      I have seen and examined this patient. I have reviewed and edited the note started by the MA and agree with the outlined plan. Part of this note was written by using a voice dictation software. The note has been proof read but may still contain some grammatical/other typographical errors.       Eileen Dempsey [facility]

## 2022-11-07 ENCOUNTER — OFFICE VISIT (OUTPATIENT)
Dept: ONCOLOGY | Age: 72
End: 2022-11-07
Payer: MEDICARE

## 2022-11-07 ENCOUNTER — PREP FOR PROCEDURE (OUTPATIENT)
Dept: ONCOLOGY | Age: 72
End: 2022-11-07

## 2022-11-07 VITALS
BODY MASS INDEX: 30.15 KG/M2 | RESPIRATION RATE: 12 BRPM | TEMPERATURE: 98.5 F | DIASTOLIC BLOOD PRESSURE: 80 MMHG | SYSTOLIC BLOOD PRESSURE: 101 MMHG | HEIGHT: 71 IN | HEART RATE: 92 BPM | OXYGEN SATURATION: 97 % | WEIGHT: 215.4 LBS

## 2022-11-07 DIAGNOSIS — C61 PROSTATE CANCER (HCC): Primary | ICD-10-CM

## 2022-11-07 PROCEDURE — 3078F DIAST BP <80 MM HG: CPT | Performed by: UROLOGY

## 2022-11-07 PROCEDURE — 99215 OFFICE O/P EST HI 40 MIN: CPT | Performed by: UROLOGY

## 2022-11-07 PROCEDURE — 3017F COLORECTAL CA SCREEN DOC REV: CPT | Performed by: UROLOGY

## 2022-11-07 PROCEDURE — G8417 CALC BMI ABV UP PARAM F/U: HCPCS | Performed by: UROLOGY

## 2022-11-07 PROCEDURE — 1036F TOBACCO NON-USER: CPT | Performed by: UROLOGY

## 2022-11-07 PROCEDURE — 3074F SYST BP LT 130 MM HG: CPT | Performed by: UROLOGY

## 2022-11-07 PROCEDURE — G8484 FLU IMMUNIZE NO ADMIN: HCPCS | Performed by: UROLOGY

## 2022-11-07 PROCEDURE — 1123F ACP DISCUSS/DSCN MKR DOCD: CPT | Performed by: UROLOGY

## 2022-11-07 PROCEDURE — G8427 DOCREV CUR MEDS BY ELIG CLIN: HCPCS | Performed by: UROLOGY

## 2022-11-07 ASSESSMENT — PATIENT HEALTH QUESTIONNAIRE - PHQ9
SUM OF ALL RESPONSES TO PHQ QUESTIONS 1-9: 0
2. FEELING DOWN, DEPRESSED OR HOPELESS: 0
SUM OF ALL RESPONSES TO PHQ QUESTIONS 1-9: 0

## 2022-11-07 ASSESSMENT — ENCOUNTER SYMPTOMS
RESPIRATORY NEGATIVE: 1
GASTROINTESTINAL NEGATIVE: 1

## 2022-11-07 NOTE — PATIENT INSTRUCTIONS
Patient Instructions from Today's Visit    Reason for Visit:  Follow up, biopsy pathology     Diagnosis Information:  [email or web address]      Plan: Your biopsy does show prostate cancer. You were given a Vernal Score of 4+3=7. This puts you at an intermediate risk. Treatment for this included surgery to remove the prostate, or radiation with hormone therapy. Surgery before radiation is preferred. Surgery after radiation comes with more complications. After the surgery, you will experience urinary leakage, that should continue to improve. Erectile dysfunction is also a common side effect of the surgery. With radiation, you may experience urinary frequency/urgency. Follow Up: We will get you scheduled for the prostatectomy. You will spend at least one night in the hospital. You will go home with a catheter and this will stay in for 10-14 days. We remove this in the office    Recent Lab Results:      Treatment Summary has been discussed and given to patient:         -------------------------------------------------------------------------------------------------------------------    Patient does express an interest in My Chart. My Chart log in information explained on the after visit summary printout at the [facility] Sarah Edouard [address] desk. AMADO Brandon      Prostatectomy: Resource Videos  [email or web address]   Home Sultana Catheter Care - YouTube  [email or web address]   After Your Prostatectomy - YouTube  [email or web address]                      Surgery Date:_________    PROSTATECTOMY    PRE & POST OPERATIVE INSTRUCTIONS    PRE-OP INSTRUCTIONS:  You may need to stop blood-thinning medications. See attached sheet in pre-op packet   Nothing to eat or drink after midnight unless instructed differently by the hospital nurse when you call for your check in time.   Day of surgery-Shower, do not apply any creams, body oils, lotions, deodorants. You may brush your teeth; rinse your mouth, but do not swallow the water. Leave all jewelry and valuables at home or with a loved one. If you wear contact lenses, glasses or hearing aids, please bring a case or container to protect them while you are in surgery. If you wear contacts, please bring your solution for your contacts. You will be admitted to the hospital overnight and will need to stay in town the night after being discharged if you live more than an hour away from the hospital.         POST-OP INSTRUCTIONS:    No lifting over 10 pounds for the first 6 weeks after surgery. No driving a car until you are off all pain medications and kang catheter has been removed. Showers are okay after surgery, avoid tub baths until all incisions are well healed. Drink plenty of fluids. Your urine should be a pale yellow in color, if not increase   your fluid intake. You may be informed to remain on a clear liquid diet following discharge from the hospital until you are passing gas regularly. Once you are passing gas, you can eat small amounts of soft food every 2 hours while awake. If your appetite is poor, you can try high calorie supplements. Please follow the recommendation that is given at the time of discharge. If you have nausea and vomiting please stop eating and go back to clear liquids for 24 hours      POST-OP MEDICATIONS:  Oxybutynin - only take as needed for bladder spasms - i.e. if they are causing pain or a large amount of leakage. You may take from one tablet every 8 hours (up to 3 in a 24 hour period) depending on your symptoms. Stop taking this medication the day before the catheter comes out.   Antibiotic - should be given a script upon discharge for an antibiotic to take the day of your catheter removal.  Sildenafil - if your surgery was a nerve sparing procedure we will give you prescriptions for Viagra/Levitra to be taken to help nerve healing at the time of your catheter removal.  If you are getting your catheter removed at another Dr's. office please the PA/resident know at time of discharge so they can give you this prescription. Stool softener: Please take one tablet twice a day as long as you are taking any type of pain medication or the Oxybutynin for bladder spasms since these are both very constipating medications. Bacitracin: You can apply a thin layer of this to the tip of your penis twice a day after cleaning the area with warm water. This will help decrease irritation related to your catheter. Pain Medication: You may be prescribed a narcotic after this surgery. As soon as you are home you should begin alternating with Tylenol. Do NOT take more than 3000mg of Tylenol in a 24 hour period. If your urine is clear and yellow you may begin using Ibuprofen 800 mg every 8 hours. The sooner you can wean off narcotics and use just Tylenol and Ibuprofen the easier it will be for you to have regular bowel movements. FREQUENTLY ASKED POST- OP QUESTIONS    BLADDER SPASMS:  Common, may see urine leaking around catheter especially when   trying to have a bowel movement. You may feel a sudden urge to urinate, lower abdominal/flank pain, or a pain at the tip of the penis. You may see some blood and whitish to yellowish discharge. BLOOD IN THE CATHETER BAG:  Pink to light red - increase your liquid intake, if it does not clear within 4 to 6 hours call our office. Warning - if your urine is the color or consistency of Ketchup, call immediately. CONSTIPATION:  Make sure you are taking the stool softener prescribed at the time of discharge. Increase liquid intake and walk frequently throughout the day. Remember you may not have a bowel movement for 3 days after surgery. If you feel constipated or uncomfortable, you may try warm prune juice, Milk of Magnesium or Miralax.   SWELLING OF THE TESTICLES AND PENIS: Area may be swollen and bruised. Wear some good scrotal support when walking (brief underwear instead of boxers). When lying or sitting, roll a hand towel up and place under scrotum to elevate. If you do develop swelling, ELEVATE as listed above. You can use ice packs (frozen peas) off and on to help with the discomfort. Please make sure you have a barrier between the ice pack and skin. SURGERY INSTRUCTIONS / BLOOD THINNERS      IF YOU HAVE ANY HEART ISSUES, HISTORY OF BLOOD CLOTS, STROKE OR OTHER MEDICAL CONDITIONS THAT REQUIRE YOU TO BE ON BLOOD THINNING AGENTS, PLEASE ALERT OUR STAFF. YOU ALSO MAY NEED TO CONTACT YOUR PRESCRIBING PHYSICIAN / CADIOLOGIST FOR THEIR RECOMMENDATION REGARDING THESE MEDICATIONS. IF YOUR ARE CURRENTLY ON COUMADIN (WARFARIN), PERSANTINE, PLAVIX, HEPARIN, LOVENOX, XARELTO, ELIQUIS OR TICLID:  Please discuss the use of these medications with your doctor as when to discontinue/continue use of these medications. ANTI-INFLAMMATORY DRUGS: (SHOULD BE STOPPED 5 DAYS) If you are taking these for a specific medical condition, please alert office for a possible alternative medication         Some examples: Advil, Aleve, Ansaid, Anaprox, Clinoril, Daypro, Feldene, Ibuprofen, Indocin, Midol, Motrin, Nalfon, Naprosyn, Orudis, Ponstil, Tolectin, Toradol      ASPIRIN PRODUCTS:. If you are taking a full dose 325 mg aspirin, change to 81 mg baby aspirin 5 days prior to surgery. Stop other aspirin products 5 days before surgery. Some examples: Jennifer Palisade, Ascriptin, Bufferin, [place] Tablets, Ecotrin, Empirin, Equagesic, Excedrin, Florinol, Four way cold tablets, Midol, Norgesic, Nuprin, Pamprin, Synalgos, Triaminicin, Vanquish, Zorprin      VITAMINS & HERBAL MEDICATIONS : (STOP 5 DAYS PRIOR TO SURGERY)      This is only a partial list.  Please check with your physician or pharmacist regarding any other medications.     If you have any questions or concerns, please call our office:  [phone]                        Kegel / Mo Nassar Exercises for Stress Urinary Incontinence              The muscles that need exercise after removal of the prostate are located around the rectum and the base of the penis. Exercise will improve the condition of these muscles by increasing both tone and strength. After many years these muscles may be weak, and exercise could help. There are two groups of muscles on which to concentrate your exercise. These two groups are to be tightened one right after the other. The first muscle is around the rectum. This is the muscle you tighten when you suddenly want to stop a bowel movement or suddenly want to stop the flow of urine. The second muscle is around the base of the penis. This is the muscle you use to expel the final drops of urine at the end of urination. This same muscle is the muscle you keep tightening repetitively to ejaculate semen out the urethra at the time of orgasm. By pretending to ejaculate and holding it, you will contract all the right sets of muscles. When you tighten these muscles you may have the sensation that the penis is being pulled closer to the body. In order to perform the exercise correctly, tighten first the set of muscles around the rectum and hold this muscle tight while you tighten the second set of muscles. Try to hold both sets of muscles as tight as possible for a count of 10. Repeat these six times, resting one minute between each exercise. On your very first exercise. Start to urinate, do the exercise midstream - if you can stop the flow of urine, you are doing the exercise correctly. One convenient way to do the exercises is to sit on the commode (toilet seat) and relax. Tighten the muscles in the sequence as described and hold the muscles very tight for the count of 10. Then relax prior to the next set of exercises.               The best time to do the exercise is before bed so that the muscles can then rest during the night. You also will want to hold these muscles tight when rising from a sitting to standing position during the day, or when you are attempting to lift something. Once achieving satisfactory urinary control by performing these exercises, it is important to continue to keep the muscles of the pelvic floor in good condition, so from time to time it is good to continue to do the exercises. Every once in a while a patient finds that if he stops performing the exercises, urinary control is not as good and that by resuming the exercise, urinary control improves. Practice these prior to surgery  Do not do them while your catheter is in  Begin them again once catheter is removed                        [facility]    Whether you travel home by car or airplane, at least every hour stretch your legs and walk (a) to promote blood flow in your legs and (b) to prevent pooling of blood that could lead to clot formation in the leg veins. Blood clots in the legs can travel to the lungs and cause pulmonary emboli. Pulmonary emboli can be life threatening. Elevate legs as much as possible. Suggested exercises:  Keep heels on the floor and raise toes up and down. Keep toes on the floor and raise heels up and down. Stand and raise knees to waist level one at a time. Stand and raise yourself up and down on your toes. Stay well hydrated during your journey especially in hot weather. Drink plenty of liquids once you are home and recuperating. Even after you are home, if you travel by car or airplane within the first 3 - 4 months, you should remember to walk and stretch your legs every hour.     After leaving the hospital, if you develop any of the following:  chest pain, pain while breathing, shortness of breath, unexplained fever, leg pain, leg tenderness or swelling, you may be developing a leg vein clot (thrombosis) and / or have clots in the lungs (pulmonary emboli). Please seek immediate physician care at the nearest hospital emergency room. Skin rashes occur from time to time in people with and without known allergies. They can be associated particularly with antibiotics or antibacterials. If you get a rash, stop your antibiotic and call your physician, as he may be able to prescribe an antihistamine to help alleviate the rash symptoms and start you on a new antibiotic. You need to walk every day, but if at any time you feel your heart racing or pounding hard in your chest, rest immediately. Take it easier on your next walk. Movement is encouraged to prevent the development of clots in your leg veins, which could become like-threatening pulmonary emboli as described in comment #1. How To Care For Your Sultana Catheter  What is it? A Sultana catheter is a thin rubber tube which is put into your bladder (organ that holds urine). It is used to drain urine out of your body. A Sultana catheter is also called an \"indwelling catheter\". After the catheter is inserted, its tiny tip can be inflated (filled) with sterile (clean) water to make a small balloon. The balloon holds the Sultana in place and keeps it from slipping out of your bladder. A Sultana catheter can stay in the bladder for a short or long time. How is a Sultana catheter put into my bladder? A caregiver will usually put the catheter in for you. You will lie down on a bed with your legs spread apart. Your caregiver will use special soap to clean the skin around your urinary meatus. The urinary meatus is the opening where your urine comes out. The caregiver will put the Sultana into your urinary meatus and gently push it into your urethra. When the catheter reaches your bladder, the caregiver will inflate the catheter tip. Your catheter will be connected to a sterile (clean) bag, which will collect your urine.  You may have a small leg bag or a larger bag that is attached to your bed or clothing. Daily Care: Follow these steps every day. These will help prevent a bladder or kidney infection and will keep you more comfortable. Always wash your hands before and after doing catheter care. Use soap and warm water. Keep your skin and catheter clean. Clean the skin around your catheter at least once each day. Clean your skin area and catheter after every bowel movement (BM). Always keep your urine bag below the level of your bladder. This is at about the level of your waist. Keeping the bag below this level will prevent urine from flowing back into your bladder from the tubing and urine bag. Wear cotton underwear. This will allow good air flow and drying in your genital area. Drink plenty of liquids. Drink at least 8 cups of healthy liquids each day. Follow your caregiver's advice if you must change the amount of liquid you drink. For most people, healthy liquids to drink are water, juices, and milk. Limit the amount of caffeine in your diet. Caffeine may make you urinate too much and lose too much body fluid. Caffeine may be found in coffee, tea, soda pop, and sports drinks and foods. Try to drink enough liquid each day, and not just when you feel thirsty. Do not tug or pull on the tubing. This can cause bleeding and hurt your urethra. Do not step on the tubing when walking. Hold the tubing curled in your hand with the urine bag below your bladder when walking. You may also want to clip or pin the tubing to your clothing. Place the catheter tubing so it does not kink or loop. When getting into bed, hang the urine bag beside the bed. Make sure the bag is below the level of your bladder. If you use movable bed rails, do not hang the urine bag on the bed rail. Hang the bag on the frame of the bed instead. Checklist for cleaning your skin and catheter:  1. Gather your supplies. Bowl of warm water, soap, washcloth, and hand towel. 2. Wash your hands.    Use warm water and soap before and after doing your catheter care. 3. Clean the skin around your catheter. First, use a clean washcloth, warm water, and soap to gently wash the tip of your penis. Wash in a [place]-like motion, moving away from the tip of your penis. Hold the end of the catheter tube to keep it from being pulled while cleaning. Wash around the catheter to remove any blood, crust, or mucus. Always wash the area around your anus last. Put the wash cloth into the laundry and do not use it again until it has been washed. Rinse and pat dry your genital area and catheter with a clean towel. 4. Secure the catheter and bag. Make sure you use both the statlock (a sticker like clip) and leg strap to secure your bag. This will prevent the catheter from tugging and possibly being pulled out. At night you can use a safety pin; you can put the safety pin through the string on top of your down drain bag to fasten it to the bed sheet to keep it from pulling. The drainage bag may have hooks to fasten to your bed frame. Do not put the bag on the floor. Leave some slack in the tube so the catheter will not be pulled when you move your leg. Caring for your Sultana or leg drainage bag:  Larger, sterile, drainable, two liter drainage bags or smaller sterile leg bags are used to collect your urine. To keep a closed system, these bags are connected directly to the catheter. If you use a leg bag to collect urine, a larger drainage bag may be attached at night with a special connector. Some plastic drainage bags should be changed every 5 to 7 days. Ask your caregiver how often your drainage bag should be changed. Drainage bags must be kept below the level of the bladder. This will allow gravity to help drain the urine, and will stop urine from flowing back into your bladder. Urine that flows back into your bladder increases your risk of an infection. Do not let the drainage bag rest on or touch the floor.  The tubing that goes from your urethra to a leg bag should be secured to your thigh with special tape, a leg strap, or a drain tube stabilizer. Allow extra tubing between the urethra and the point where the tubing is secured to your thigh. Emptying your Sultana or leg drainage bag:  A drainage bag should be emptied only when it is full enough that this is needed. Empty full-sized bags every eight hours, and smaller (leg) bags every 3 to 4 hours, or when they are full. The following are steps to be used when emptying your drainage bag:  Place a large plastic or metal container on the floor next to you, or you may empty the urine into the toilet. Wash your hands with soap and water. Without touching its tip, remove the drain spout from its sleeve at the bottom of the urine bag. Open the slide valve on the spout. Let the urine flow out of the urine bag into the container or toilet. Do not let the drainage tube touch anything. When the bag is empty, clean the end of the drain spout with alcohol. Close the slide valve and put the drain spout into its sleeve at the bottom of the urine bag. Wash your hands with soap and water. Write down how much urine was in your bag if caregivers have asked you to keep a record. Cleaning your Sultana or leg drainage bag:  Get a new or cleaned urine bag with tubing. Wash your hands with soap and water. Put a clamp on your Sultana catheter tubing near the connection to the urine bag tube. Unhook the old bag and hook the Sultana catheter to the new bag. Un-clamp the Sultana catheter tubing. Rinse the old bag with warm water. Fill the bag with two parts white vinegar to three parts tap water. Leave the water and vinegar mixture in the bag for a few minutes. You may use chlorine bleach and water instead of the vinegar and water solution. Empty and rinse the bag. Hang it upside down and let it air dry.   Reconnecting your Sultana or leg drainage bag:  Wash your hands with soap and water.  Use alcohol or another solution suggested by your caregiver to clean the catheter and drainage bag ends. Attach them back together. Wash your hands with soap and water. Fixing catheter problems:  If your catheter is not draining:  Check for kinks. See if the urine tubing is twisted or bent. See if you are lying on the catheter or tubing. Make sure the urine bag is below the level of your bladder (waist level). If your catheter is still not draining and you are experiencing abdominal pain please call the nurse's line or the on call urology resident. If your catheter comes out or is leaking:  Place a towel or waterproof pad under you to protect your furniture if your catheter leaks or comes out. Do not try to put the catheter back in unless you have been taught how to insert a Sultana catheter. Look for these signs of leaking: The level of urine in the bag has stopped rising, and no urine has drained from the catheter in 6 to 8 hours. Your bed or clothes are wet with urine. Call your caregiver if:  You cannot get your catheter to drain urine into the bag. Your catheter comes out or it is leaking. No urine has drained from your catheter in 6 to 8 hours. You have pain or burning in your urethra, bladder, abdomen, or lower back. You have shaking chills or your temperature is over 101° F (38.3° C    WHAT TO EXPECT AFTER A RADICAL   PROSTATECTOMY    When you return to your room after leaving the postoperative recovery room, a skilled nursing team dedicated to having you functioning as quickly as possible will care for you. Being alert and able to follow directions and to learn what to expect during convalescence will be a priority. A special program of pain control will be instituted. You should breathe deeply to expand your lungs as much as possible from time to time. Early ambulation and early resumption of diet will be encouraged.   One of the most important things to do is to keep moving your legs when lying in bed. You can also raise your knees off the bed one at a time. Foot and leg movement and early ambulation are encouraged to promote good blood flow in your legs and especially good return of blood in the veins. A very serious complication is the clotting of blood in a leg vein. Clots may break away and go to the lung, shutting off blood flow to the lung. This event is known as a pulmonary embolus and can be life threatening. So, every effort is encouraged to get you moving, walking in the hallways and out of the hospital as soon as possible. Lying around too long in a hospital bed can be dangerous. Early discharge from the hospital is also encouraged so that you avoid acquiring hospital-based infections, some very serious and difficult to treat. You will leave the hospital with your prostate gland removed for localized cancer. At a minimum, the healing period last about two months following surgery. The catheter will be removed during the interval 9-14 days after surgery. As you go out of the hospital, you may possibly have some abdominal swelling and tenderness. A very loose pair of old trousers or sweat pants supported by suspenders worn under your shirt is useful. A loose fitting shirt or jacket works well to cover up whatever type of pants are selected. Loose clothing will keep any pressure off the tender areas. Urinary catheter instruction:  You are being dismissed from the hospital with a urinary catheter called a Sultana catheter.   The catheter is held in the bladder with a balloon which is at the end of the catheter and sitting in the bladder. You may feel as though you are sitting on a bubble or have discomfort between the scrotum and the rectum. It is very important that no tension be applied to the catheter or the balloon.   Undue tension can result in the balloon being forcefully drawn through the bladder neck and into the urethra causing disruption of the sutures that connect the bladder opening to the urethra (tube that goes through the penis). Do not be concerned if urine occasionally leaks out around the catheter. This is usually due to bladder spasms, which is to be expected. The presence of a Sultana catheter and a balloon in the bladder tends to induce bladder spasms, and the bladder spasms can be annoying. They usually get better with time. It is very important to make sure the catheter drains well and that there is no tugging on the balloon, which will increase the number of spasms. Make sure that you use your leg strap as noted below. During the daytime, you have been instructed to wear a leg bag, and the leg bag should be positioned on the leg so there is slack in the catheter tubing between where it attached to the bag and where it comes out of the penis. The catheter must never be snagged, yanked, or pulled. At nighttime, you are being provided with a leg strap and a night drainage bag. The leg strap should be applied securely to the drainage tubing very close to the catheter so that during the night if there is any pull on the bag; the force will be taken by the leg strap. There should be satisfactory slack in the Sultana catheter from the point of attachment to the drainage tubing and the penis. The leg strap specifically should always be worn during the daytime together with the leg bag. Make sure you are using the straps that come with the leg bag. The leg strap should always be worn to prevent a situation where there is too much tension on the balloon and catheter dislodgement. Make sure that your night bag and your leg bag are lower than your bladder for better drainage. This helps to prevent spasms, which causes urine leakage around the catheter. From time to time, you may note that the urine becomes blood-tinged. Do not worry about this.   Sometimes the bladder will have a quick contraction or spasm and urine and blood may come out around the catheter rather than through it. These occurrences are normal as long as a catheter is in place. This can also be noticed when having a bowel movement or straining. If there is no bowel movement within 3 days of discharge or you feel extremely uncomfortable, try Milk of Magnesium or Miralax. If this does not help, call our office for further instructions. Before discharge, you may receive instructions on how to irrigate the catheter should the catheter become plugged. Sometimes excessive activity can cause bleeding from the bladder because the tip of the Sultana catheter irritates the bladder lining. If you develop bleeding to the point that clots occur, you will need to moderate your activity and be prepared to irrigate the catheter should it become plugged. It is usually possible to irrigate out small clots. Rarely, large clots form, and you may need to have a physician assist you, but this would be extremely rare and is only mentioned as a matter of record. The above information is provided for reassurance purposes. When you return for catheter removal, it is best to return wearing old clothes (sweatpants recommended) so that you don't soil your good clothes. At first, you may find yourself leaking with no control at all. Gradually, as the swelling in the tissue subsides and the normal elasticity, muscle strength, nerve control, and bladder capacity return, urinary control will gradually improve. Recovery time varies widely from patient to patient and typically takes weeks and months, not hours or minutes. At first, putting an absorbent pad inside a Depends brief may be necessary. Change the pads as needed. To avoid skin irritation or rash, use a zinc oxide ointment if necessary. Please remember to do your Ruba or Kegel exercises faithfully and religiously. These exercises will improve muscle tone by active exercise which improves your urinary control.   Above all, do not get discouraged and do not be frustrated by loss of urinary control. Do what you have to do to stay as dry as possible and be patient as you heal.  Urinary control usually appears first at night and then progressively becomes better during the day. Urinary control may be better in the morning when the muscles are well rested. The area where the bladder joins the urine tube (the urethra) is very delicate. If you are not able to urinate, it is very important that only experienced personnel attempt to pass a catheter to drain the bladder. The valve mechanism can be damaged by inappropriate urethral catheterization, so under these circumstances, it is important to see a urologist.  Sometimes it is necessary to place a temporarily catheter directly into the bladder through the skin over the bladder area. This avoids any damage to the surgical area where the bladder is joined to the urethra. As you leave the hospital, remember that it takes several months to heal and that includes your incisions. You can do all of the walking that you want to but be careful stair climbing until your catheter is removed. Please do not do any heavy lifting or straining for at least six weeks after surgery. On average, the Sultana catheter is left indwelling for 10 -14 days following surgery, and in order to prevent irritation at the tip of the penis where the catheter comes out, it is important to keep the catheter well lubricated with antibiotic ointment. This will help to reduce irritation and discomfort from the catheter when you are moving about. You should be prescribed an antibiotic ointment named Bacitracin. Apply a small amount twice a day, morning and evening. If you develop progressive difficulty urinating and feel as though you cannot empty the bladder as the weeks go by, you may have developed scar tissue which is blocking your urine flow. .  This may require dilatation which is done by an experienced urologist. Dilatation stretches the area of scarring and is often sufficient to take care of the situation. Drains: If you are dismissed home with a drain, drainage will go through the tube into the plastic lemon-sized bulb. Do not be concerned if there is minor leakage around the drain tube at the skin level. However, keep the skin around the tube clean and dry. If the drain appears to be clogged and all the drainage is coming out at the skin level with no drainage in the bulb, action may be necessary. Try milking the tubing with opposed thumb and forefinger pressure. Drain output will vary. Do not be alarmed by day-to-day fluctuation in the amount of drainage. Keep a daily record of the amount from each drain. Your physician will tell you when it is appropriate to remove the drain. After the drain is removed you might notice some drainage from the drain site. This should be clear or pale yellow in color. Keep this area clean and dry washing with warm water and patting dry with a clean towel. Cover the incision site with dry gauze and change out the gauze as needed to keep the area clean and dry. If you notice that it has a foul odor or has green pus like drainage please call the nursing line as this can be indicative of infection. Note:  If at any time during the convalescence at home, you note leg pain or tenderness, this may be the first sign of a blood clot. If such symptoms occur, please visit your local physician or emergency room for evaluation and possible treatment. Sexual function:  Sexual function is a very sensitive subject. The nerves that provide erections hug your prostate, so operations on the prostate can produce changes in sexual function. The degree of recovery of erectile function after radical prostatectomy is quite variable in terms of time. If you have a small amount of low grade cancer, your surgeon will try to spare these nerves.   If you have a large amount of cancer or it is high grade cancer, then nerves may not be spared in order to remove as much cancer as possible. Most (90%) prostate cancers begin on the back side of the prostate and may be close to these nerves. The importance of erectile function varies from one person to the next. We recommend penile rehabilitation with Viagra while nerves heal to make recovery of sexual function more likely. Recovery of erection may occur faster if patients use the medications. Viagra promotes good blood flow by keeping the tissues of the penis healthy as nerves heal.  The ability to have an orgasm is not normally affected. At the time of orgasm, there is no ejaculation of semen because the seminal vesicles and prostate have been removed. Some slight secretion may be noted from time to time. This secretion comes from glands lining the urethra, including the bulbourethral glands, a pair of glands that sit behind the urethra. Some patients never lose the ability to have an erection despite the surgery. They even experience erection with the catheter in place. Erections with the catheter in place can be quite painful but are not dangerous and need to be tolerated until the catheter can be removed. A little lubricating jelly placed at the tip of the penis will help decrease discomfort for those who have erection with catheter in place. The above information is included in this handout in order to bring useful information and to reduce some of the anxiety of not knowing exactly what is going to happen. Please do not hesitate to call our office if there are any questions or concerns that need to be addressed.

## 2022-11-11 NOTE — PRE-PROCEDURE INSTRUCTIONS
Patient verified name and . Order for consent was not found in EHR ; patient verifies procedure. Type II surgery, phone assessment complete. Orders were not received. Labs per surgeon: none received. Labs per anesthesia protocol: CBC, BMP, Type and Screen DOS. EKG: 10/4/2022, acceptable per protocol. Patient answered medical/surgical history questions at their best of ability. All prior to admission medications documented in [facility]. Patient instructed to take the following medications the day of surgery according to anesthesia guidelines with a small sip of water: Loratadine and Omeprazole as needed. On the day before surgery please take Acetaminophen 1000mg in the morning and then again before bed. You may substitute for Tylenol 650 mg. Hold all vitamins 7 days prior to surgery and NSAIDS 5 days prior to surgery. Prescription meds to hold:none. Patient instructed on the following:    > Arrive at [facility], time of arrival to be called the day before by 1700  > NPO after midnight, unless otherwise indicated, including gum, mints, and ice chips  > Responsible adult must drive patient to the hospital, stay during surgery, and patient will need supervision 24 hours after anesthesia  > Use antibacterial soap in shower the night before surgery and on the morning of surgery  > All piercings must be removed prior to arrival.    > Leave all valuables (money and jewelry) at home but bring insurance card and ID on DOS.   > You may be required to pay a deductible or co-pay on the day of your procedure. You can pre-pay by calling [phone] if your surgery is at the [facility] or [phone] if your surgery is at the [facility]. > Do not wear make-up, nail polish, lotions, cologne, perfumes, powders, or oil on skin. Artificial nails are not permitted.

## 2022-11-14 ENCOUNTER — ANESTHESIA EVENT (OUTPATIENT)
Dept: SURGERY | Age: 72
End: 2022-11-14
Payer: MEDICARE

## 2022-11-14 NOTE — PERIOP NOTE
Patient informed of preop arrival time of 0900 for 1115 procedure on 11/15. PreOp instructions reviewed.

## 2022-11-15 ENCOUNTER — HOSPITAL ENCOUNTER (OUTPATIENT)
Age: 72
Setting detail: OBSERVATION
Discharge: HOME | End: 2022-11-16
Attending: UROLOGY | Admitting: UROLOGY
Payer: MEDICARE

## 2022-11-15 ENCOUNTER — ANESTHESIA (OUTPATIENT)
Dept: SURGERY | Age: 72
End: 2022-11-15
Payer: MEDICARE

## 2022-11-15 DIAGNOSIS — C61 PROSTATE CANCER (HCC): ICD-10-CM

## 2022-11-15 LAB
ABO + RH BLD: NORMAL
ANION GAP SERPL CALC-SCNC: 8 MMOL/L (ref 2–11)
BLOOD GROUP ANTIBODIES SERPL: NORMAL
BUN SERPL-MCNC: 16 MG/DL (ref 8–23)
CALCIUM SERPL-MCNC: 9 MG/DL (ref 8.3–10.4)
CHLORIDE SERPL-SCNC: 111 MMOL/L (ref 101–110)
CO2 SERPL-SCNC: 22 MMOL/L (ref 21–32)
CREAT SERPL-MCNC: 0.9 MG/DL (ref 0.8–1.5)
ERYTHROCYTE [DISTWIDTH] IN BLOOD BY AUTOMATED COUNT: 13.2 % (ref 11.9–14.6)
GLUCOSE SERPL-MCNC: 107 MG/DL (ref 65–100)
HCT VFR BLD AUTO: 46.9 % (ref 41.1–50.3)
HGB BLD-MCNC: 15.5 G/DL (ref 13.6–17.2)
MCH RBC QN AUTO: 29.7 PG (ref 26.1–32.9)
MCHC RBC AUTO-ENTMCNC: 33 G/DL (ref 31.4–35)
MCV RBC AUTO: 89.8 FL (ref 82–102)
NRBC # BLD: 0 K/UL (ref 0–0.2)
PLATELET # BLD AUTO: 182 K/UL (ref 150–450)
PMV BLD AUTO: 10.1 FL (ref 9.4–12.3)
POTASSIUM SERPL-SCNC: 4.2 MMOL/L (ref 3.5–5.1)
RBC # BLD AUTO: 5.22 M/UL (ref 4.23–5.6)
SODIUM SERPL-SCNC: 141 MMOL/L (ref 133–143)
SPECIMEN EXP DATE BLD: NORMAL
WBC # BLD AUTO: 6 K/UL (ref 4.3–11.1)

## 2022-11-15 PROCEDURE — 2500000003 HC RX 250 WO HCPCS: Performed by: NURSE ANESTHETIST, CERTIFIED REGISTERED

## 2022-11-15 PROCEDURE — 88305 TISSUE EXAM BY PATHOLOGIST: CPT

## 2022-11-15 PROCEDURE — 85027 COMPLETE CBC AUTOMATED: CPT

## 2022-11-15 PROCEDURE — 2580000003 HC RX 258: Performed by: ANESTHESIOLOGY

## 2022-11-15 PROCEDURE — 80048 BASIC METABOLIC PNL TOTAL CA: CPT

## 2022-11-15 PROCEDURE — 6360000002 HC RX W HCPCS: Performed by: NURSE ANESTHETIST, CERTIFIED REGISTERED

## 2022-11-15 PROCEDURE — 88309 TISSUE EXAM BY PATHOLOGIST: CPT

## 2022-11-15 PROCEDURE — 2720000010 HC SURG SUPPLY STERILE: Performed by: UROLOGY

## 2022-11-15 PROCEDURE — 6370000000 HC RX 637 (ALT 250 FOR IP): Performed by: ANESTHESIOLOGY

## 2022-11-15 PROCEDURE — 3600000009 HC SURGERY ROBOT BASE: Performed by: UROLOGY

## 2022-11-15 PROCEDURE — 7100000000 HC PACU RECOVERY - FIRST 15 MIN: Performed by: UROLOGY

## 2022-11-15 PROCEDURE — 2709999900 HC NON-CHARGEABLE SUPPLY: Performed by: UROLOGY

## 2022-11-15 PROCEDURE — 6370000000 HC RX 637 (ALT 250 FOR IP): Performed by: UROLOGY

## 2022-11-15 PROCEDURE — 2580000003 HC RX 258: Performed by: NURSE ANESTHETIST, CERTIFIED REGISTERED

## 2022-11-15 PROCEDURE — S2900 ROBOTIC SURGICAL SYSTEM: HCPCS | Performed by: UROLOGY

## 2022-11-15 PROCEDURE — 3600000019 HC SURGERY ROBOT ADDTL 15MIN: Performed by: UROLOGY

## 2022-11-15 PROCEDURE — 86850 RBC ANTIBODY SCREEN: CPT

## 2022-11-15 PROCEDURE — 7100000001 HC PACU RECOVERY - ADDTL 15 MIN: Performed by: UROLOGY

## 2022-11-15 PROCEDURE — 6360000002 HC RX W HCPCS: Performed by: UROLOGY

## 2022-11-15 PROCEDURE — 2580000003 HC RX 258: Performed by: UROLOGY

## 2022-11-15 PROCEDURE — 38571 LAPAROSCOPY LYMPHADENECTOMY: CPT | Performed by: UROLOGY

## 2022-11-15 PROCEDURE — G0378 HOSPITAL OBSERVATION PER HR: HCPCS

## 2022-11-15 PROCEDURE — 55866 LAPS SURG PRST8ECT RPBIC RAD: CPT | Performed by: UROLOGY

## 2022-11-15 PROCEDURE — 3700000000 HC ANESTHESIA ATTENDED CARE: Performed by: UROLOGY

## 2022-11-15 PROCEDURE — 88304 TISSUE EXAM BY PATHOLOGIST: CPT

## 2022-11-15 PROCEDURE — 3700000001 HC ADD 15 MINUTES (ANESTHESIA): Performed by: UROLOGY

## 2022-11-15 PROCEDURE — 6360000002 HC RX W HCPCS: Performed by: ANESTHESIOLOGY

## 2022-11-15 PROCEDURE — 88307 TISSUE EXAM BY PATHOLOGIST: CPT

## 2022-11-15 RX ORDER — SODIUM CHLORIDE 0.9 % (FLUSH) 0.9 %
5-40 SYRINGE (ML) INJECTION PRN
Status: DISCONTINUED | OUTPATIENT
Start: 2022-11-15 | End: 2022-11-16 | Stop reason: HOSPADM

## 2022-11-15 RX ORDER — ACETAMINOPHEN 325 MG/1
650 TABLET ORAL EVERY 4 HOURS PRN
Status: DISCONTINUED | OUTPATIENT
Start: 2022-11-15 | End: 2022-11-16 | Stop reason: HOSPADM

## 2022-11-15 RX ORDER — MIDAZOLAM HYDROCHLORIDE 2 MG/2ML
2 INJECTION, SOLUTION INTRAMUSCULAR; INTRAVENOUS
Status: DISCONTINUED | OUTPATIENT
Start: 2022-11-15 | End: 2022-11-15 | Stop reason: HOSPADM

## 2022-11-15 RX ORDER — ACETAMINOPHEN 500 MG
1000 TABLET ORAL ONCE
Status: COMPLETED | OUTPATIENT
Start: 2022-11-15 | End: 2022-11-15

## 2022-11-15 RX ORDER — KETOROLAC TROMETHAMINE 30 MG/ML
INJECTION, SOLUTION INTRAMUSCULAR; INTRAVENOUS PRN
Status: DISCONTINUED | OUTPATIENT
Start: 2022-11-15 | End: 2022-11-15 | Stop reason: SDUPTHER

## 2022-11-15 RX ORDER — LIDOCAINE HYDROCHLORIDE 20 MG/ML
INJECTION, SOLUTION EPIDURAL; INFILTRATION; INTRACAUDAL; PERINEURAL PRN
Status: DISCONTINUED | OUTPATIENT
Start: 2022-11-15 | End: 2022-11-15 | Stop reason: SDUPTHER

## 2022-11-15 RX ORDER — SODIUM CHLORIDE 0.9 % (FLUSH) 0.9 %
5-40 SYRINGE (ML) INJECTION EVERY 12 HOURS SCHEDULED
Status: DISCONTINUED | OUTPATIENT
Start: 2022-11-15 | End: 2022-11-15 | Stop reason: HOSPADM

## 2022-11-15 RX ORDER — ONDANSETRON 2 MG/ML
INJECTION INTRAMUSCULAR; INTRAVENOUS PRN
Status: DISCONTINUED | OUTPATIENT
Start: 2022-11-15 | End: 2022-11-15 | Stop reason: SDUPTHER

## 2022-11-15 RX ORDER — PROPOFOL 10 MG/ML
INJECTION, EMULSION INTRAVENOUS PRN
Status: DISCONTINUED | OUTPATIENT
Start: 2022-11-15 | End: 2022-11-15 | Stop reason: SDUPTHER

## 2022-11-15 RX ORDER — ASPIRIN 81 MG/1
81 TABLET ORAL DAILY
Status: DISCONTINUED | OUTPATIENT
Start: 2022-11-16 | End: 2022-11-16 | Stop reason: HOSPADM

## 2022-11-15 RX ORDER — SODIUM CHLORIDE, SODIUM LACTATE, POTASSIUM CHLORIDE, CALCIUM CHLORIDE 600; 310; 30; 20 MG/100ML; MG/100ML; MG/100ML; MG/100ML
INJECTION, SOLUTION INTRAVENOUS CONTINUOUS
Status: DISCONTINUED | OUTPATIENT
Start: 2022-11-15 | End: 2022-11-15 | Stop reason: HOSPADM

## 2022-11-15 RX ORDER — SODIUM CHLORIDE 0.9 % (FLUSH) 0.9 %
5-40 SYRINGE (ML) INJECTION PRN
Status: DISCONTINUED | OUTPATIENT
Start: 2022-11-15 | End: 2022-11-15 | Stop reason: HOSPADM

## 2022-11-15 RX ORDER — ONDANSETRON 2 MG/ML
4 INJECTION INTRAMUSCULAR; INTRAVENOUS
Status: DISCONTINUED | OUTPATIENT
Start: 2022-11-15 | End: 2022-11-15 | Stop reason: HOSPADM

## 2022-11-15 RX ORDER — SODIUM CHLORIDE 0.9 % (FLUSH) 0.9 %
5-40 SYRINGE (ML) INJECTION EVERY 12 HOURS SCHEDULED
Status: DISCONTINUED | OUTPATIENT
Start: 2022-11-15 | End: 2022-11-16 | Stop reason: HOSPADM

## 2022-11-15 RX ORDER — SODIUM CHLORIDE, SODIUM LACTATE, POTASSIUM CHLORIDE, CALCIUM CHLORIDE 600; 310; 30; 20 MG/100ML; MG/100ML; MG/100ML; MG/100ML
INJECTION, SOLUTION INTRAVENOUS CONTINUOUS
Status: DISCONTINUED | OUTPATIENT
Start: 2022-11-15 | End: 2022-11-16

## 2022-11-15 RX ORDER — ROCURONIUM BROMIDE 10 MG/ML
INJECTION, SOLUTION INTRAVENOUS PRN
Status: DISCONTINUED | OUTPATIENT
Start: 2022-11-15 | End: 2022-11-15 | Stop reason: SDUPTHER

## 2022-11-15 RX ORDER — GLYCOPYRROLATE 0.2 MG/ML
INJECTION INTRAMUSCULAR; INTRAVENOUS PRN
Status: DISCONTINUED | OUTPATIENT
Start: 2022-11-15 | End: 2022-11-15 | Stop reason: SDUPTHER

## 2022-11-15 RX ORDER — IPRATROPIUM BROMIDE AND ALBUTEROL SULFATE 2.5; .5 MG/3ML; MG/3ML
1 SOLUTION RESPIRATORY (INHALATION)
Status: DISCONTINUED | OUTPATIENT
Start: 2022-11-15 | End: 2022-11-15 | Stop reason: HOSPADM

## 2022-11-15 RX ORDER — TROSPIUM CHLORIDE 20 MG/1
20 TABLET, FILM COATED ORAL 2 TIMES DAILY PRN
Status: DISCONTINUED | OUTPATIENT
Start: 2022-11-15 | End: 2022-11-16 | Stop reason: HOSPADM

## 2022-11-15 RX ORDER — OXYCODONE HYDROCHLORIDE 5 MG/1
5 TABLET ORAL EVERY 4 HOURS PRN
Status: DISCONTINUED | OUTPATIENT
Start: 2022-11-15 | End: 2022-11-16 | Stop reason: HOSPADM

## 2022-11-15 RX ORDER — HYDROMORPHONE HYDROCHLORIDE 2 MG/ML
0.5 INJECTION, SOLUTION INTRAMUSCULAR; INTRAVENOUS; SUBCUTANEOUS EVERY 10 MIN PRN
Status: DISCONTINUED | OUTPATIENT
Start: 2022-11-15 | End: 2022-11-15 | Stop reason: HOSPADM

## 2022-11-15 RX ORDER — DEXAMETHASONE SODIUM PHOSPHATE 10 MG/ML
INJECTION INTRAMUSCULAR; INTRAVENOUS PRN
Status: DISCONTINUED | OUTPATIENT
Start: 2022-11-15 | End: 2022-11-15 | Stop reason: SDUPTHER

## 2022-11-15 RX ORDER — FENTANYL CITRATE 50 UG/ML
50 INJECTION, SOLUTION INTRAMUSCULAR; INTRAVENOUS EVERY 5 MIN PRN
Status: DISCONTINUED | OUTPATIENT
Start: 2022-11-15 | End: 2022-11-15 | Stop reason: HOSPADM

## 2022-11-15 RX ORDER — EPHEDRINE SULFATE/0.9% NACL/PF 50 MG/5 ML
SYRINGE (ML) INTRAVENOUS PRN
Status: DISCONTINUED | OUTPATIENT
Start: 2022-11-15 | End: 2022-11-15 | Stop reason: SDUPTHER

## 2022-11-15 RX ORDER — SENNA AND DOCUSATE SODIUM 50; 8.6 MG/1; MG/1
1 TABLET, FILM COATED ORAL 2 TIMES DAILY
Status: DISCONTINUED | OUTPATIENT
Start: 2022-11-15 | End: 2022-11-16 | Stop reason: HOSPADM

## 2022-11-15 RX ORDER — LIDOCAINE HYDROCHLORIDE 10 MG/ML
1 INJECTION, SOLUTION INFILTRATION; PERINEURAL
Status: DISCONTINUED | OUTPATIENT
Start: 2022-11-15 | End: 2022-11-15 | Stop reason: HOSPADM

## 2022-11-15 RX ORDER — OXYCODONE HYDROCHLORIDE 5 MG/1
5 TABLET ORAL
Status: DISCONTINUED | OUTPATIENT
Start: 2022-11-15 | End: 2022-11-15 | Stop reason: HOSPADM

## 2022-11-15 RX ORDER — NEOSTIGMINE METHYLSULFATE 1 MG/ML
INJECTION, SOLUTION INTRAVENOUS PRN
Status: DISCONTINUED | OUTPATIENT
Start: 2022-11-15 | End: 2022-11-15 | Stop reason: SDUPTHER

## 2022-11-15 RX ORDER — HALOPERIDOL 5 MG/ML
1 INJECTION INTRAMUSCULAR
Status: DISCONTINUED | OUTPATIENT
Start: 2022-11-15 | End: 2022-11-15 | Stop reason: HOSPADM

## 2022-11-15 RX ORDER — FENTANYL CITRATE 50 UG/ML
INJECTION, SOLUTION INTRAMUSCULAR; INTRAVENOUS PRN
Status: DISCONTINUED | OUTPATIENT
Start: 2022-11-15 | End: 2022-11-15 | Stop reason: SDUPTHER

## 2022-11-15 RX ORDER — ONDANSETRON 2 MG/ML
4 INJECTION INTRAMUSCULAR; INTRAVENOUS EVERY 6 HOURS PRN
Status: DISCONTINUED | OUTPATIENT
Start: 2022-11-15 | End: 2022-11-16 | Stop reason: HOSPADM

## 2022-11-15 RX ADMIN — ROCURONIUM BROMIDE 10 MG: 50 INJECTION, SOLUTION INTRAVENOUS at 11:24

## 2022-11-15 RX ADMIN — LIDOCAINE HYDROCHLORIDE 100 MG: 20 INJECTION, SOLUTION EPIDURAL; INFILTRATION; INTRACAUDAL; PERINEURAL at 11:12

## 2022-11-15 RX ADMIN — PHENYLEPHRINE HYDROCHLORIDE 200 MCG: 10 INJECTION INTRAVENOUS at 11:31

## 2022-11-15 RX ADMIN — Medication 10 MG: at 11:44

## 2022-11-15 RX ADMIN — ONDANSETRON 4 MG: 2 INJECTION INTRAMUSCULAR; INTRAVENOUS at 11:43

## 2022-11-15 RX ADMIN — PROPOFOL 150 MG: 10 INJECTION, EMULSION INTRAVENOUS at 11:12

## 2022-11-15 RX ADMIN — GLYCOPYRROLATE 0.4 MG: 0.2 INJECTION, SOLUTION INTRAMUSCULAR; INTRAVENOUS at 13:35

## 2022-11-15 RX ADMIN — SODIUM CHLORIDE, POTASSIUM CHLORIDE, SODIUM LACTATE AND CALCIUM CHLORIDE: 600; 310; 30; 20 INJECTION, SOLUTION INTRAVENOUS at 16:58

## 2022-11-15 RX ADMIN — PHENYLEPHRINE HYDROCHLORIDE 100 MCG: 10 INJECTION INTRAVENOUS at 11:47

## 2022-11-15 RX ADMIN — ROCURONIUM BROMIDE 40 MG: 50 INJECTION, SOLUTION INTRAVENOUS at 11:12

## 2022-11-15 RX ADMIN — SENNOSIDES AND DOCUSATE SODIUM 1 TABLET: 8.6; 5 TABLET ORAL at 21:00

## 2022-11-15 RX ADMIN — PHENYLEPHRINE HYDROCHLORIDE 200 MCG: 10 INJECTION INTRAVENOUS at 13:13

## 2022-11-15 RX ADMIN — FENTANYL CITRATE 100 MCG: 50 INJECTION, SOLUTION INTRAMUSCULAR; INTRAVENOUS at 11:52

## 2022-11-15 RX ADMIN — SODIUM CHLORIDE, SODIUM LACTATE, POTASSIUM CHLORIDE, AND CALCIUM CHLORIDE: 600; 310; 30; 20 INJECTION, SOLUTION INTRAVENOUS at 09:32

## 2022-11-15 RX ADMIN — PROPOFOL 50 MG: 10 INJECTION, EMULSION INTRAVENOUS at 11:14

## 2022-11-15 RX ADMIN — Medication 3 MG: at 13:35

## 2022-11-15 RX ADMIN — PHENYLEPHRINE HYDROCHLORIDE 200 MCG: 10 INJECTION INTRAVENOUS at 11:23

## 2022-11-15 RX ADMIN — Medication 15 MG: at 11:36

## 2022-11-15 RX ADMIN — SODIUM CHLORIDE, PRESERVATIVE FREE 10 ML: 5 INJECTION INTRAVENOUS at 21:00

## 2022-11-15 RX ADMIN — ACETAMINOPHEN 650 MG: 325 TABLET ORAL at 23:00

## 2022-11-15 RX ADMIN — SODIUM CHLORIDE, SODIUM LACTATE, POTASSIUM CHLORIDE, AND CALCIUM CHLORIDE: 600; 310; 30; 20 INJECTION, SOLUTION INTRAVENOUS at 11:54

## 2022-11-15 RX ADMIN — SODIUM CHLORIDE, SODIUM LACTATE, POTASSIUM CHLORIDE, AND CALCIUM CHLORIDE: 600; 310; 30; 20 INJECTION, SOLUTION INTRAVENOUS at 13:14

## 2022-11-15 RX ADMIN — PHENYLEPHRINE HYDROCHLORIDE 200 MCG: 10 INJECTION INTRAVENOUS at 13:30

## 2022-11-15 RX ADMIN — HYDROMORPHONE HYDROCHLORIDE 0.5 MG: 2 INJECTION, SOLUTION INTRAMUSCULAR; INTRAVENOUS; SUBCUTANEOUS at 15:18

## 2022-11-15 RX ADMIN — PHENYLEPHRINE HYDROCHLORIDE 100 MCG: 10 INJECTION INTRAVENOUS at 11:12

## 2022-11-15 RX ADMIN — FENTANYL CITRATE 100 MCG: 50 INJECTION, SOLUTION INTRAMUSCULAR; INTRAVENOUS at 11:12

## 2022-11-15 RX ADMIN — PHENYLEPHRINE HYDROCHLORIDE 200 MCG: 10 INJECTION INTRAVENOUS at 11:20

## 2022-11-15 RX ADMIN — ROCURONIUM BROMIDE 20 MG: 50 INJECTION, SOLUTION INTRAVENOUS at 12:20

## 2022-11-15 RX ADMIN — HYDROMORPHONE HYDROCHLORIDE 0.5 MG: 2 INJECTION, SOLUTION INTRAMUSCULAR; INTRAVENOUS; SUBCUTANEOUS at 14:48

## 2022-11-15 RX ADMIN — Medication 2000 MG: at 11:09

## 2022-11-15 RX ADMIN — FENTANYL CITRATE 50 MCG: 50 INJECTION, SOLUTION INTRAMUSCULAR; INTRAVENOUS at 14:12

## 2022-11-15 RX ADMIN — ACETAMINOPHEN 1000 MG: 500 TABLET ORAL at 09:36

## 2022-11-15 RX ADMIN — KETOROLAC TROMETHAMINE 30 MG: 30 INJECTION, SOLUTION INTRAMUSCULAR; INTRAVENOUS at 13:26

## 2022-11-15 RX ADMIN — DEXAMETHASONE SODIUM PHOSPHATE 10 MG: 10 INJECTION INTRAMUSCULAR; INTRAVENOUS at 11:43

## 2022-11-15 RX ADMIN — PHENYLEPHRINE HYDROCHLORIDE 200 MCG: 10 INJECTION INTRAVENOUS at 11:35

## 2022-11-15 ASSESSMENT — PAIN SCALES - GENERAL
PAINLEVEL_OUTOF10: 8
PAINLEVEL_OUTOF10: 7
PAINLEVEL_OUTOF10: 3
PAINLEVEL_OUTOF10: 3

## 2022-11-15 ASSESSMENT — PAIN DESCRIPTION - LOCATION
LOCATION: ABDOMEN

## 2022-11-15 ASSESSMENT — PAIN DESCRIPTION - DESCRIPTORS: DESCRIPTORS: ACHING

## 2022-11-15 ASSESSMENT — PAIN - FUNCTIONAL ASSESSMENT: PAIN_FUNCTIONAL_ASSESSMENT: NONE - DENIES PAIN

## 2022-11-15 NOTE — ANESTHESIA PRE PROCEDURE
Department of Anesthesiology  Preprocedure Note       Name:  Shawn Strickland   Age:  71 y.o.  :  1950                                          MRN:  [id number]         Date:  11/15/2022      Surgeon: Alex Paul): Helena Adkins MD    Procedure: Procedure(s):  ROBOTIC ASSISTED LAPAROSCOPIC PROSTATECTOMY WITH BILATERAL LYMPH NODE DISSECTION    Medications prior to admission:   Prior to Admission medications    Medication Sig Start Date End Date Taking?  Authorizing Provider   Coenzyme Q10 (COQ10 PO) Take by mouth daily   Yes Historical Provider, MD   Cholecalciferol (VITAMIN D3) 50 MCG (2000) CAPS Take by mouth daily    Historical Provider, MD   omeprazole (PRILOSEC) 20 MG delayed release capsule Take 1 capsule by mouth every morning (before breakfast)  Patient taking differently: Take 20 mg by mouth as needed 10/4/22   Chaya Powell DO   MAGNESIUM CARBONATE PO Take by mouth daily    Ar Automatic Reconciliation   POTASSIUM CITRATE PO Take by mouth daily    Ar Automatic Reconciliation   ZINC PO Take by mouth    Ar Automatic Reconciliation   ascorbic acid (VITAMIN C) 250 MG tablet Take 250 mg by mouth daily    Ar Automatic Reconciliation   aspirin 81 MG EC tablet Take by mouth daily    Ar Automatic Reconciliation   atorvastatin (LIPITOR) 10 MG tablet Take 10 mg by mouth at bedtime 22   Ar Automatic Reconciliation   loratadine (CLARITIN) 10 MG tablet Take 10 mg by mouth as needed    Ar Automatic Reconciliation       Current medications:    Current Facility-Administered Medications   Medication Dose Route Frequency Provider Last Rate Last Admin    lidocaine 1 % injection 1 mL  1 mL IntraDERmal Once PRN Vi Borrero MD        acetaminophen (TYLENOL) tablet 1,000 mg  1,000 mg Oral Once Vi Borrero MD        famotidine (PEPCID) 20 mg in sodium chloride (PF) 0.9 % 10 mL injection  20 mg IntraVENous Once PRN Vi Borrero MD        lactated ringers infusion   IntraVENous Continuous Shukri MD Jose 30 mL/hr at 11/15/22 0932 New Bag at 11/15/22 0932    midazolam PF (VERSED) injection 2 mg  2 mg IntraVENous Once PRN Danny Nguyen MD        ipratropium-albuterol (DUONEB) nebulizer solution 1 ampule  1 ampule Inhalation Once PRN Danny Nguyen MD        ceFAZolin (ANCEF) 2000 mg in sterile water 20 mL IV syringe  2,000 mg IntraVENous On Call to [id number] Cathryn Stevens MD           Allergies:     Allergies   Allergen Reactions    Rosuvastatin Myalgia       Problem List:    Patient Active Problem List   Diagnosis Code    History of pneumonia Z87.01    Hypercholesteremia E78.00    Class 1 obesity due to excess calories with serious comorbidity and body mass index (BMI) of 30.0 to 30.9 in adult E66.09, Z68.30    Sleep apnea G47.30    Atrial tachycardia, paroxysmal (HCC) I47.1    Thoracic aortic aneurysm without rupture I71.20    Hypertension I10    History of throat cancer Z85.819    Neck pain M54.2    Atherosclerosis of native coronary artery of native heart without angina pectoris I25.10    Nocturia R35.1    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    Seasonal allergic rhinitis J30.2    GERD (gastroesophageal reflux disease) K21.9    Status post total left knee replacement Z96.652    Bilateral carotid artery disease (HCC) I77.9    Asbestosis ([address] J61    Elevated prostate specific antigen (PSA) R97.20    Prostate cancer ([facility] [address] C61       Past Medical History:        Diagnosis Date    Abnormal EKG     Arthritis     osteo    Asbestosis ([address]     CAD (coronary artery disease)     Cancer ([facility] [address] 2009    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     Chest pain, precordial     Chronic pain     knee    Coronary atherosclerosis of native coronary artery 4/13/2016    Dry mouth     since cancer treatment    GERD (gastroesophageal reflux disease)     controlled w/med    History of pneumonia 2007    Hypercholesteremia      diet controlled    Hypertension     controlled w/med    Malaise and fatigue     S/P total knee replacement using cement 11/25/2013    Shingles 2007    Sleep apnea     does not use or have CPAP anymore - sleeping good since 30-35 weight loss 2011 per pt    Status post total left knee replacement 12/30/2015       Past Surgical History:        Procedure Laterality Date    CARDIAC CATHETERIZATION      COLONOSCOPY  1/04    Dr. Raji Dan with normal findings    COLONOSCOPY  01/14/2019    Dr. Susannah Ulrich; diverticulosis and 2 fragments of tubular adenomas    KNEE ARTHROSCOPY Left 1990    PROSTATE SURGERY N/A 10/27/2022    MRI FUSION PROSTATE BIOPSY performed by Masood Kramer MD at [facility] ARTHROSCOPY Right     TOTAL KNEE ARTHROPLASTY Left 12/30/2015    Dr. Gerhardt Older Bilateral 2013, 2014    total    TOTAL KNEE ARTHROPLASTY Bilateral 2005    Partial -        Social History:    Social History     Tobacco Use    Smoking status: Never    Smokeless tobacco: Never   Substance Use Topics    Alcohol use:  Yes     Alcohol/week: 16.0 standard drinks     Comment: occasional                                Counseling given: Not Answered      Vital Signs (Current):   Vitals:    11/11/22 0902 11/15/22 0922   BP:  (!) 169/98   Pulse:  79   Resp:  16   Temp:  98.6 °F (37 °C)   TempSrc:  Oral   SpO2:  95%   Weight: 213 lb (96.6 kg)    Height: 5' 11\" (1.803 m)                                               BP Readings from Last 3 Encounters:   11/15/22 (!) 169/98   11/07/22 101/80   10/28/22 (!) 141/83       NPO Status: Time of last liquid consumption: 2200                        Time of last solid consumption: 2200                        Date of last liquid consumption: 11/14/22                        Date of last solid food consumption: 11/14/22    BMI:   Wt Readings from Last 3 Encounters:   11/11/22 213 lb (96.6 kg)   11/07/22 215 lb 6.4 oz (97.7 kg)   10/28/22 214 lb (97.1 kg)     Body mass index is 29.71 kg/m². CBC:   Lab Results   Component Value Date/Time    WBC 6.0 11/15/2022 09:20 AM    RBC 5.22 11/15/2022 09:20 AM    HGB 15.5 11/15/2022 09:20 AM    HCT 46.9 11/15/2022 09:20 AM    MCV 89.8 11/15/2022 09:20 AM    RDW 13.2 11/15/2022 09:20 AM     11/15/2022 09:20 AM       CMP:   Lab Results   Component Value Date/Time     12/28/2020 09:47 AM    K 4.3 12/28/2020 09:47 AM     12/28/2020 09:47 AM    CO2 22 12/28/2020 09:47 AM    BUN 14 12/28/2020 09:47 AM    CREATININE 0.91 12/28/2020 09:47 AM    GFRAA 99 12/28/2020 09:47 AM    AGRATIO 2.3 12/28/2020 09:47 AM    GLUCOSE 99 12/28/2020 09:47 AM    PROT 6.2 12/28/2020 09:47 AM    CALCIUM 8.9 12/28/2020 09:47 AM    BILITOT 0.6 12/28/2020 09:47 AM    ALKPHOS 57 12/28/2020 09:47 AM    AST 24 12/28/2020 09:47 AM    ALT 17 03/30/2021 10:16 AM       POC Tests: No results for input(s): POCGLU, POCNA, POCK, POCCL, POCBUN, POCHEMO, POCHCT in the last 72 hours. Coags: No results found for: PROTIME, INR, APTT    HCG (If Applicable):   Lab Results   Component Value Date    PREGTESTUR Negative 10/27/2022        ABGs: No results found for: PHART, PO2ART, VVD5BIL, ZTE9ELE, BEART, H1UPVZCU     Type & Screen (If Applicable):  No results found for: LABABO, LABRH    Drug/Infectious Status (If Applicable):  No results found for: HIV, HEPCAB    COVID-19 Screening (If Applicable): No results found for: COVID19        Anesthesia Evaluation  Patient summary reviewed  Airway: Mallampati: II          Dental: normal exam         Pulmonary:Negative Pulmonary ROS breath sounds clear to auscultation  (+) sleep apnea:                            ROS comment: Some restrictive component, asbestos exposure?    Cardiovascular:  Exercise tolerance: good (>4 METS),   (+) hypertension:, hyperlipidemia    (-) past MI, murmur and peripheral edema      Rhythm: regular  Rate: normal                 ROS comment: Aortic aneursym     Neuro/Psych:   Negative Neuro/Psych ROS (-) CVA           GI/Hepatic/Renal: Neg GI/Hepatic/Renal ROS  (+) GERD:,           Endo/Other: Negative Endo/Other ROS   (+) malignancy/cancer (base of tounge s/p radiation, denies trouble swallowing, sometimes reports stiff neck. ). Abdominal:             Vascular: negative vascular ROS.  + PVD, aortic or cerebral (b/l carotid disease possibly r/t radiation?), . Other Findings:           Anesthesia Plan      general     ASA 2     (GETA)  Induction: intravenous. arterial line    Anesthetic plan and risks discussed with patient.                         Leon Fitzpatrick MD   11/15/2022

## 2022-11-15 NOTE — PERIOP NOTE
TRANSFER - OUT REPORT:    Verbal report given to BETO Knutson on Luristic  being transferred to [id number] for routine post-op       Report consisted of patients Situation, Background, Assessment and   Recommendations(SBAR). Information from the following report(s) Adult Overview, Surgery Report, Intake/Output, and MAR was reviewed with the receiving nurse. Lines:   Peripheral IV 11/15/22 Left Hand (Active)   Site Assessment Clean, dry & intact 11/15/22 1422   Line Status Infusing 11/15/22 1422   Phlebitis Assessment No symptoms 11/15/22 1422   Infiltration Assessment 0 11/15/22 1422   Dressing Status Clean, dry & intact 11/15/22 1422   Dressing Type Transparent 11/15/22 1422        Opportunity for questions and clarification was provided. Patient transported with:   O2 @ 3 liters    VTE prophylaxis orders have been written for Luristic. Patient and family given floor number and nurses name.

## 2022-11-15 NOTE — OP NOTE
PREOPERATIVE DIAGNOSES:  1. Prostate cancer. POSTOPERATIVE DIAGNOSES:  1. Prostate cancer. PROCEDURES:  1. Robotic-assisted laparoscopic radical prostatectomy, left 100% nerve sparing, right 25% nerve sparing. 2. Bilateral pelvic lymph node dissection. SURGEON: Yissel Landers M.D.    ASSISTANTS:  Al Person      ANESTHESIA: General endotracheal anesthesia. SPECIMENS:  1. Left pelvic lymph nodes. 2. Right pelvic lymph nodes. 3. Periprostatic fat. 4. Prostate and seminal vesicles. 5. Posterior bladder neck margin  6. Left urethral margin    IMPLANTS: None    DRAINS:  1. 20-Cymraes Sultana catheter to down drain. 2. 19-Cymraes pelvic North drain to bulb suction. ANTIBIOTICS: Ancef    COMPLICATIONS: None. ESTIMATED BLOOD LOSS: 50 cc    INDICATIONS: The patient is a 71year old year old male with [place] 4+3 prostate cancer. He was diagnosed on transrectal biopsy for an elevated PSA. He now presents for prostatectomy for treatment of his prostate cancer. Risks and benefits of procedure were discussed in detail with the patient. DESCRIPTION OF PROCEDURE: General endotracheal anesthesia was induced and the patient was placed in a steep Trendelenburg position with legs stirrups. The patient's shoulders, arms, and legs were all carefully padded. His abdomen and genitals were prepped and draped in usual sterile fashion. An 18-Cymraes Sultana catheter was inserted into the bladder sterilely. A time out was performed and antibiotics were given. A Veress needle was inserted into the umbilicus easily and after passing a saline drop test, the abdomen was insufflated to 15mmHg. An 8-mm supraumbilical port was placed. The camera was inserted and noted no intra-abdominal injuries. An 8-mm robotic arm port was placed in the left lateral abdomen and a 12-mm assistant port was placed in the left medial abdomen.  Two 8-mm robotic ports were placed in the right abdomen and the robot was docked. Adhesions between the bowel and peritoneum were incised. The pouch of Danetta Ape was identified and the anterior peritoneum was incised along the posterior surface of the bladder, identifying the seminal vesicles and vas deferens. We then incised the Denonvilliers fascia and performed a posterior dissection toward the apex of the prostate, mobilizing the rectum off of the posterior surface of the prostate. The vasa deferentia were mobilized and divided using cautery. The seminal vesicle vessels were cauterized. The seminal vesicles were then mobilized off of the posterior surface of the bladder and the peritoneum. We then performed a left pelvic lymph node dissection. The peritoneum overlying the external iliac vessels was incised lateral to the medial umbilical ligament vertically. We identified the external iliac vein, the obturator nerve and vessels and the internal iliac vein. The lymph node packet was identified and dissected. A Hem-o-tanna clip was placed distal to the node of Screven and the packet was excised. The boundary of pelvic lymph node dissection included the levator ani muscles posteriorly, external iliac vein anteriorly, internal iliac vein superiorly, node of Screven inferiorly, pelvic sidewall laterally and bladder medially. The lymph node packet was placed in an EndoCatch bag and removed from the patient. The contralateral pelvic lymph node dissection was performed with identical margins. The median and medial umbilical ligaments were identified and transected superior to the bladder and the space of Retzius was developed. We then cauterized the superficial venous complex and dissected prostatic fat off of the prostate and bladder neck and removed it as a specimen. We cleared off the endopelvic fascia bilaterally and opened them sharply. The prostate was then freed from the levator ani muscles and the pubourethralis muscle. We incised the puboprostatic ligaments bilaterally.  The deep venous complex was then tied with a 0 V-lock suture. We incised the bladder neck circumferentially, taking care to avoid the prostate and the ureteral orifices. The prostatic pedicles were taken using sequential Hem-o-tanna clips bilaterally. We then peeled the neurovascular bundle off both sides of the prostate sparing the neurovascular bundles as described above based on his tumor location according to his biopsy and MRI findings. We then transected the dorsal venous complex and urethra, and the prostate was placed into an EndoCatch bag. The urethrovesical anastomosis was performed using a 3-0 double-arm V-Loc suture in a running fashion. A new 20-Colombian Sultana catheter was inserted into the bladder and the bladder was irrigated with 120 mL of normal saline, confirming no evidence of urine leak. Powdered surgicel was placed in the pelvis to help ensure good hemostasis. A round Laymond Carton drain was placed through the left lateral port and down to the pelvic region near the anastomosis and was secured to the skin using a 2-0 nylon suture. The robot was undocked and all ports were removed. A 4-cm Pfannenstiel incision was made and the rectus fascia was opened transversely. We split the rectus muscle in the midline and the prostate was removed from this incision. The rectus fascia was closed using a #1 PDS suture. The 12 mm port site fascia was closed with 0 vicryl. All skin incisions were approximated using 4-0 Monocryl subcuticular sutures. Wounds were dressed with Mastisol, Steri-Strips, Telfa and Tegaderm and the Sultana catheter was placed to down drain and secured to the leg. The North drain was placed to bulb suction. The patient was awoken from general anesthesia and transferred to the PACU in stable condition. Attending physician, Dr. Felisa Wheatley was present for all critical portions of the case. DISPOSITION:  1. The patient will be admitted to the floor for routine postoperative care  2.  Will plan for discharge tomorrow morning and will go home with kang catheter for 10-14 days and subsequent 2 week post-op follow-up    I was present for the key and critical portions of the operative service and personally supervised or performed those portions.     Germaine Fenton MD MPH [id number]

## 2022-11-15 NOTE — PROGRESS NOTES
TRANSFER - IN REPORT:    Verbal report received from [facility] [id number] on Hemant Morris  being received from PACU for routine progression of patient care      Report consisted of patient's Situation, Background, Assessment and   Recommendations(SBAR). Information from the following report(s) Nurse Handoff Report was reviewed with the receiving nurse. Opportunity for questions and clarification was provided. Assessment completed upon patient's arrival to unit and care assumed.

## 2022-11-15 NOTE — DISCHARGE INSTRUCTIONS
No lifting >10 lbs for four weeks  No driving while catheter is still in and while you are on pain medication. Avoid  baths, pools or hot tubs for 1 month; however, you may shower on the second day. Stay hydrated and keep walking once you are home. Continue stool softeners for two weeks. Continue catheter care as instructed by nursing. Remove dressings from abdomen tomorrow (with exception of KEENA dressing site). Radical Retropubic Prostatectomy: What to Expect at Home  Your Recovery  A radical retropubic prostatectomy is surgery to remove the prostate gland. It is usually done to treat prostate cancer that hasn't spread beyond the prostate. The doctor did the surgery through a 3- to 4-inch cut, called an incision, in your lower belly between the navel and the pubic bone. You may see some bruising and swelling right after your surgery. In the week after surgery, your penis and scrotum may swell. This usually gets better after 1 to 2 weeks. The incision may be sore for 1 to 2 weeks. Your doctor will give you medicine for pain. You will have a tube (urinary catheter) to drain urine from your bladder for the first 1 to 2 weeks after surgery. You may have bladder cramps, or spasms, while the catheter is in your bladder. Your doctor can give you medicine to help prevent bladder spasms. After your catheter is removed, it may take several weeks or more for you to control your urine. And it may take 6 months or more for you to be able to have erections again. But with time, most people regain urine control and much of their previous sexual function. If not, medicines or other treatments may help. You will probably be able to go back to work or your usual activities 3 to 5 weeks after surgery. But it can take longer to fully recover. You will need to see your doctor regularly. This includes having blood tests to measure your PSA level. PSA is a substance that can suggest whether your cancer has returned.  PSA tests are usually done more often for the first several years after your surgery, but less often after that. This care sheet gives you a general idea about how long it will take for you to recover. But each person recovers at a different pace. Follow the steps below to get better as quickly as possible. How can you care for yourself at home? Activity    Rest when you feel tired. Getting enough sleep will help you recover. Try to walk each day. Start by walking a little more than you did the day before. Bit by bit, increase the amount you walk. Walking boosts blood flow and helps prevent pneumonia and constipation. At first, avoid hills, and try to stay on flat ground. You can climb stairs, but try to limit how often you do this. When you do climb them, do it slowly, and pause every few steps. Avoid strenuous activities for 6 to 8 weeks, or until your doctor says it is okay. This includes bicycle riding, jogging, weight lifting, or aerobic exercise. For 6 to 8 weeks or until your doctor says it is okay, avoid lifting anything that would make you strain. This may include a child, heavy grocery bags and milk containers, a heavy briefcase or backpack, cat litter or dog food bags, or a vacuum . You may shower if your doctor says it is okay. Empty the catheter bag before you start. When you shower, keep the catheter taped to your leg. Do not take a bath until your doctor or nurse has removed the catheter. Ask your doctor when you can drive again. Avoid riding in a car for more than 1 hour at a time for the first 3 weeks after surgery. If you must ride in a car for a longer distance, stop often to walk and stretch your legs. You will probably need to take 3 to 5 weeks off from work. It depends on the type of work you do and how you feel. Diet    You can eat your normal diet. If your stomach is upset, try bland, low-fat foods like plain rice, broiled chicken, toast, and yogurt. Drink plenty of fluids (unless your doctor tells you not to). You may notice that your bowel movements are not regular right after your surgery. This is common. Try to avoid constipation and straining with bowel movements. You may want to take a fiber supplement every day. If you have not had a bowel movement after a couple of days, ask your doctor about taking a mild laxative. Medicines    Your doctor will tell you if and when you can restart your medicines. The doctor will also give you instructions about taking any new medicines. If you stopped taking aspirin or some other blood thinner, your doctor will tell you when to start taking it again. Take pain medicines exactly as directed. If the doctor gave you a prescription medicine for pain, take it as prescribed. If you are not taking a prescription pain medicine, ask your doctor if you can take an over-the-counter medicine. If you think your pain medicine is making you sick to your stomach: Take your medicine after meals (unless your doctor has told you not to). Ask your doctor for a different pain medicine. Your doctor may prescribe antibiotics when your urinary catheter is removed. Take them as directed. Do not stop taking them just because you feel better. You need to take the full course of antibiotics. Incision care    If you have strips of tape on the incision, leave the tape on for a week or until it falls off. If you had stitches, your doctor will tell you when to come back to have them removed. Wash the area daily with warm water and pat it dry. Don't use hydrogen peroxide or alcohol, which can slow healing. You may cover the area with a gauze bandage if it weeps or rubs against clothing. Change the bandage every day. Keep the area clean and dry. Ice and elevation    To help with pain and swelling, put ice or a cold pack on your groin for 10 to 20 minutes at a time. Put a thin cloth between the ice and your skin. Other instructions    You will have a urinary catheter for 1 to 2 weeks. Your doctor or nurse will tell you how to care for it. Be sure the catheter is securely taped to your thigh and attached to the large drainage bag when you are at home. Use the smaller leg bag only when you go out. A little urine leakage around the catheter is normal. You can place an incontinence pad in your underwear to absorb urine leaks. Do not have an enema or use a rectal thermometer for 3 months, or until your doctor says it is okay. Follow-up care is a key part of your treatment and safety. Be sure to make and go to all appointments, and call your doctor if you are having problems. It's also a good idea to know your test results and keep a list of the medicines you take. When should you call for help? Call 911 anytime you think you may need emergency care. For example, call if:    You passed out (lost consciousness). You have chest pain, are short of breath, or cough up blood. Call your doctor now or seek immediate medical care if:    You have pain that does not get better after you take pain medicine. You have loose stitches, or your incision comes open. You have signs of infection, such as: Increased pain, swelling, warmth, or redness. Red streaks leading from the area. Pus draining from the area. A fever. You are unable to urinate. You are bleeding from your incision. You are sick to your stomach or cannot drink fluids. You have symptoms of a urinary tract infection. This may include:  Pain or burning when you urinate. A frequent need to urinate without being able to pass much urine. Pain in the flank, which is just below the rib cage and above the waist on either side of the back. Blood in your urine. A fever. You have signs of a blood clot in your leg (called a deep vein thrombosis), such as:  Pain in your calf, back of the knee, thigh, or groin.   Redness or swelling in your leg. Watch closely for changes in your health, and be sure to contact your doctor if you have any problems. Where can you learn more? Go to https://chpenicolasa.[employer]. org and sign in to your [employer] account. Enter G135 in the [employer] box to learn more about \"Radical Retropubic Prostatectomy: What to Expect at Home. \"     If you do not have an account, please click on the \"Sign Up Now\" link. Current as of: May 4, 2022               Content Version: 13.4  © 2006-2022 [employer]. Care instructions adapted under license by [employer] [address] If you have questions about a medical condition or this instruction, always ask your healthcare professional. [address] any warranty or liability for your use of this information. Learning About How to Care for a Person's Indwelling Urinary Catheter  Introduction     A urinary catheter is a flexible plastic tube that's used to drain urine from the bladder when a person can't urinate. The catheter is placed into the bladder by inserting it through the urethra. The urethra is the opening that carries urine from the bladder to the outside of the body. When the catheter is in the bladder, a small balloon is used to keep the catheter in place. The catheter lets urine drain from the bladder into a collection bag. Urinary catheters can be used in both men and women. A catheter that stays in place for a longer period of time is called an indwelling catheter. A catheter may be needed because of certain medical conditions. These include an enlarged prostate or problems controlling urine. It may be used after surgery on the pelvis or urinary tract. Urinary catheters are also used when the lower part of the body is paralyzed. When helping a loved one with a catheter, try to be relaxed. Caring for a catheter can be embarrassing for both of you.  If you are calm and don't seem embarrassed, the person may feel more comfortable. How do you take care of the catheter? Wear disposable gloves when handling someone's catheter. Make sure to follow all of the instructions the doctor has given. And always wash your hands before and after you're done. Here are some other things to remember when caring for someone's catheter:  Make sure that urine is running out of the catheter into the urine collection bag. And make sure that the catheter tubing does not get twisted or bent. Keep the urine collection bag below the level of the bladder. At night it may be helpful to hang the bag on the side of the bed. Make sure that the urine collection bag does not drag and pull on the catheter. It's okay to shower with a catheter and urine collection bag in place, unless the doctor says not to. Check for swelling or signs of infection in the area around the catheter. Signs of infection include pus and irritated, swollen, red, or tender skin. Clean the area around the catheter daily with soap and water. Dry with a clean towel afterward. Do not apply powder or lotion to the skin around the catheter. Do not tug or pull on the catheter. Sexual intercourse may still be possible for people who wear a catheter. It's best to talk with a doctor about options. How do you empty the bag? The urine collection bag needs to be emptied regularly. It's best to empty the bag when it's about half full or at bedtime. If the doctor has asked you to measure the amount of urine, do that before you empty the urine into the toilet. When you are ready to empty the bag, follow these steps:  Put on disposable gloves. Remove the drain spout from its sleeve at the bottom of the collection bag. Open the valve on the spout. Let the urine flow out of the bag and into the toilet or a container. Do not let the tubing or drain spout touch anything. After you empty the bag, close the valve and put the drain spout back into its sleeve.   Remove your gloves, and throw them away.  Wash your hands with soap and water. How do you care for someone after the catheter is removed? After the catheter is taken out, the person may have trouble urinating. If this happens, try helping them sit in a few inches of warm water (sitz bath). If the urge to urinate comes during the sitz bath, it may be easier for them to urinate while still in the bath. Some burning may happen the first few times the person urinates. If the burning lasts longer, it may be a sign of an infection. If the catheter causes irritation or a rash, wearing loose, cotton underwear may help. Watch closely for changes in the person's health. Be sure to contact their doctor if you notice any problems or if they are unable to urinate at all. Where can you learn more? Go to https://Zemantajosianeeb.[employer]. org and sign in to your [employer] account. Enter B958 in the [employer] box to learn more about \"Learning About How to Care for a Person's Indwelling Urinary Catheter. \"     If you do not have an account, please click on the \"Sign Up Now\" link. Current as of: June 16, 2022               Content Version: 13.4  © 2006-2022 [employer]wise[employer]. Care instructions adapted under license by [facility] ([facility]). If you have questions about a medical condition or this instruction, always ask your healthcare professional. [place] [address] any warranty or liability for your use of this information.

## 2022-11-16 VITALS
BODY MASS INDEX: 29.82 KG/M2 | SYSTOLIC BLOOD PRESSURE: 140 MMHG | OXYGEN SATURATION: 98 % | DIASTOLIC BLOOD PRESSURE: 80 MMHG | RESPIRATION RATE: 18 BRPM | WEIGHT: 213 LBS | HEART RATE: 80 BPM | TEMPERATURE: 98.1 F | HEIGHT: 71 IN

## 2022-11-16 LAB
ANION GAP SERPL CALC-SCNC: 3 MMOL/L (ref 2–11)
BUN SERPL-MCNC: 18 MG/DL (ref 8–23)
CALCIUM SERPL-MCNC: 8.8 MG/DL (ref 8.3–10.4)
CHLORIDE SERPL-SCNC: 106 MMOL/L (ref 101–110)
CO2 SERPL-SCNC: 26 MMOL/L (ref 21–32)
CREAT SERPL-MCNC: 1 MG/DL (ref 0.8–1.5)
ERYTHROCYTE [DISTWIDTH] IN BLOOD BY AUTOMATED COUNT: 13.4 % (ref 11.9–14.6)
GLUCOSE SERPL-MCNC: 111 MG/DL (ref 65–100)
HCT VFR BLD AUTO: 43.8 % (ref 41.1–50.3)
HGB BLD-MCNC: 14.2 G/DL (ref 13.6–17.2)
MCH RBC QN AUTO: 29.5 PG (ref 26.1–32.9)
MCHC RBC AUTO-ENTMCNC: 32.4 G/DL (ref 31.4–35)
MCV RBC AUTO: 91.1 FL (ref 82–102)
NRBC # BLD: 0 K/UL (ref 0–0.2)
PLATELET # BLD AUTO: 202 K/UL (ref 150–450)
PMV BLD AUTO: 10.7 FL (ref 9.4–12.3)
POTASSIUM SERPL-SCNC: 4.4 MMOL/L (ref 3.5–5.1)
RBC # BLD AUTO: 4.81 M/UL (ref 4.23–5.6)
SODIUM SERPL-SCNC: 135 MMOL/L (ref 133–143)
WBC # BLD AUTO: 13.8 K/UL (ref 4.3–11.1)

## 2022-11-16 PROCEDURE — 6370000000 HC RX 637 (ALT 250 FOR IP): Performed by: UROLOGY

## 2022-11-16 PROCEDURE — 80048 BASIC METABOLIC PNL TOTAL CA: CPT

## 2022-11-16 PROCEDURE — 36415 COLL VENOUS BLD VENIPUNCTURE: CPT

## 2022-11-16 PROCEDURE — 85027 COMPLETE CBC AUTOMATED: CPT

## 2022-11-16 PROCEDURE — 2580000003 HC RX 258: Performed by: UROLOGY

## 2022-11-16 PROCEDURE — G0378 HOSPITAL OBSERVATION PER HR: HCPCS

## 2022-11-16 RX ORDER — OXYCODONE HYDROCHLORIDE AND ACETAMINOPHEN 5; 325 MG/1; MG/1
1 TABLET ORAL EVERY 6 HOURS PRN
Qty: 20 TABLET | Refills: 0 | Status: SHIPPED | OUTPATIENT
Start: 2022-11-16 | End: 2022-11-21

## 2022-11-16 RX ORDER — SULFAMETHOXAZOLE AND TRIMETHOPRIM 800; 160 MG/1; MG/1
1 TABLET ORAL 2 TIMES DAILY
Qty: 6 TABLET | Refills: 0 | Status: SHIPPED | OUTPATIENT
Start: 2022-11-27 | End: 2022-11-30

## 2022-11-16 RX ORDER — SENNA AND DOCUSATE SODIUM 50; 8.6 MG/1; MG/1
1 TABLET, FILM COATED ORAL 2 TIMES DAILY
Qty: 30 TABLET | Refills: 0 | Status: SHIPPED | OUTPATIENT
Start: 2022-11-16

## 2022-11-16 RX ADMIN — SENNOSIDES AND DOCUSATE SODIUM 1 TABLET: 8.6; 5 TABLET ORAL at 09:00

## 2022-11-16 RX ADMIN — SODIUM CHLORIDE, PRESERVATIVE FREE 10 ML: 5 INJECTION INTRAVENOUS at 09:01

## 2022-11-16 RX ADMIN — ASPIRIN 81 MG: 81 TABLET ORAL at 09:00

## 2022-11-16 NOTE — PROGRESS NOTES
Hourly rounds complete this shift, no new complaints at this time, Patient up and walking this shift increased KEENA drain output 40,60,80  this evening bed in low, locked position, call light and bedside table within reach,  all needs met. Will continue to monitor Report to day shift nurse.

## 2022-11-16 NOTE — CARE COORDINATION
Chart reviewed by . Patient is a 71year old male, admitted with Prostate Cancer. CM met with patient to discuss discharge planning. Patient presented alert and oriented x4. Demographics verified. Patient lives with his spouse in a two level home and is independent at baseline. Patient denies any DME Use. PCP and insurance verified. Patient is able to afford prescription medications. Discharge planning: Patient will return home at discharge with family. No CM needs expressed or identified at this time. CM continues to monitor. 11/16/22 2681   Service Assessment   Patient Orientation Alert and Oriented;Person;Place;Situation   Cognition Alert   History Provided By Patient   Primary Caregiver Self   Accompanied By/Relationship Spouse   Support Systems Spouse/Significant Other   Patient's Healthcare Decision Maker is: Legal Next of Kin  (Spouse)   PCP Verified by CM Yes  (Dave Rodriguez, [address] [facility])   Last Visit to PCP Within last 3 months   Prior Functional Level Independent in ADLs/IADLs   Current Functional Level Independent in ADLs/IADLs   Can patient return to prior living arrangement Yes   Ability to make needs known: Good   Family able to assist with home care needs: Yes   Financial Resources Medicare   Social/Functional History   Lives With Spouse   Type of [address] [facility] Two level   Home Access Stairs to enter without [address]   (Denies DME use.)   ADL Assistance Independent   Ambulation Assistance Independent   Transfer Assistance Independent   Active  Yes   Mode of Transportation Car   Occupation Retired   Discharge Planning   Type of [address] Prior To Admission None   Potential Assistance Needed N/A   DME Ordered?  No   Potential Assistance Purchasing Medications No   Patient expects to be discharged to: [address] Discharge   Transition of Care Consult (CM Consult) Discharge [address] Discharge None    Resource Information Provided? No   Mode of Transport at Discharge Other (see comment)  (Family.)   Confirm Follow Up Transport Self   Condition of Participation: Discharge Planning   The Plan for Transition of Care is related to the following treatment goals: Return to baseline.

## 2022-11-16 NOTE — ANESTHESIA POSTPROCEDURE EVALUATION
Department of Anesthesiology  Postprocedure Note    Patient: Ramesh Galeana  MRN: [id number]  YOB: 1950  Date of evaluation: 11/15/2022      Procedure Summary     Date: 11/15/22 Room / Location: [facility] MAIN OR 11 / [facility] MAIN OR    Anesthesia Start: 1104 Anesthesia Stop: 1300    Procedure: ROBOTIC ASSISTED LAPAROSCOPIC PROSTATECTOMY WITH BILATERAL LYMPH NODE DISSECTION (Abdomen) Diagnosis:       Prostate cancer ([address]      (Prostate cancer ([address] Jay Jay Rodriguez)    Providers: Masood Kramer MD Responsible Provider: Roseanna Smith MD    Anesthesia Type: general ASA Status: 2          Anesthesia Type: No value filed.     Ashley Phase I: Ashley Score: 9    Ashley Phase II:        Anesthesia Post Evaluation    Patient location during evaluation: PACU  Patient participation: complete - patient participated  Level of consciousness: awake and alert  Airway patency: patent  Nausea & Vomiting: no nausea and no vomiting  Complications: no  Cardiovascular status: hemodynamically stable  Respiratory status: acceptable, nonlabored ventilation and spontaneous ventilation  Hydration status: euvolemic  Comments: BP (!) 140/80   Pulse 95   Temp 97.9 °F (36.6 °C) (Oral)   Resp 20   Ht 5' 11\" (1.803 m)   Wt 213 lb (96.6 kg)   SpO2 98%   BMI 29.71 kg/m²     Multimodal analgesia pain management approach

## 2022-11-16 NOTE — PROGRESS NOTES
Urology Progress Note    Admit Date: 11/15/2022    Subjective:     Patient has no new complaints. He has been up walking x 2. Objective:     Patient Vitals for the past 8 hrs:   BP Temp Temp src Pulse Resp SpO2   11/16/22 0413 123/73 97 °F (36.1 °C) Oral 83 18 96 %   11/15/22 2351 127/89 98.4 °F (36.9 °C) Axillary 92 19 98 %     No intake/output data recorded. 11/14 1901 - 11/16 0700  In: 2300 [I.V.:2300]  Out: 1395 [Urine:1050; Drains:295]    Physical Exam:     Awake, alert, and oriented  Lungs no JVD. Breathing is  non-labored; no audible wheezing.     Heart regular, normal perfusion  Abd soft, nt, nd  UOP clear      Data Review   Recent Results (from the past 24 hour(s))   TYPE AND SCREEN    Collection Time: 11/15/22  9:10 AM   Result Value Ref Range    Crossmatch expiration date 11/18/2022,2359     ABO/Rh A NEGATIVE     Antibody Screen NEG    CBC    Collection Time: 11/15/22  9:20 AM   Result Value Ref Range    WBC 6.0 4.3 - 11.1 K/uL    RBC 5.22 4.23 - 5.6 M/uL    Hemoglobin 15.5 13.6 - 17.2 g/dL    Hematocrit 46.9 41.1 - 50.3 %    MCV 89.8 82 - 102 FL    MCH 29.7 26.1 - 32.9 PG    MCHC 33.0 31.4 - 35.0 g/dL    RDW 13.2 11.9 - 14.6 %    Platelets 877 255 - 752 K/uL    MPV 10.1 9.4 - 12.3 FL    nRBC 0.00 0.0 - 0.2 K/uL   Basic Metabolic Panel    Collection Time: 11/15/22  9:20 AM   Result Value Ref Range    Sodium 141 133 - 143 mmol/L    Potassium 4.2 3.5 - 5.1 mmol/L    Chloride 111 (H) 101 - 110 mmol/L    CO2 22 21 - 32 mmol/L    Anion Gap 8 2 - 11 mmol/L    Glucose 107 (H) 65 - 100 mg/dL    BUN 16 8 - 23 MG/DL    Creatinine 0.90 0.8 - 1.5 MG/DL    Est, Glom Filt Rate >60 >60 ml/min/1.73m2    Calcium 9.0 8.3 - 10.4 MG/DL   Basic Metabolic Panel    Collection Time: 11/16/22  5:13 AM   Result Value Ref Range    Sodium 135 133 - 143 mmol/L    Potassium 4.4 3.5 - 5.1 mmol/L    Chloride 106 101 - 110 mmol/L    CO2 26 21 - 32 mmol/L    Anion Gap 3 2 - 11 mmol/L    Glucose 111 (H) 65 - 100 mg/dL    BUN 18 8 - 23 MG/DL    Creatinine 1.00 0.8 - 1.5 MG/DL    Est, Glom Filt Rate >60 >60 ml/min/1.73m2    Calcium 8.8 8.3 - 10.4 MG/DL   CBC    Collection Time: 11/16/22  5:13 AM   Result Value Ref Range    WBC 13.8 (H) 4.3 - 11.1 K/uL    RBC 4.81 4.23 - 5.6 M/uL    Hemoglobin 14.2 13.6 - 17.2 g/dL    Hematocrit 43.8 41.1 - 50.3 %    MCV 91.1 82 - 102 FL    MCH 29.5 26.1 - 32.9 PG    MCHC 32.4 31.4 - 35.0 g/dL    RDW 13.4 11.9 - 14.6 %    Platelets 375 278 - 846 K/uL    MPV 10.7 9.4 - 12.3 FL    nRBC 0.00 0.0 - 0.2 K/uL           Assessment:     Principal Problem:    Prostate cancer ([facility] [address]  Resolved Problems:    * No resolved hospital problems. *      Pod 1 from ralp. If elizbaeth levels off, pls remove after lunch and d/c home with kang.     Plan:     -home this pm if doing well  -fu already arranged for 2 weeks  -home on pain med, softeners and 3 days of cipro for kang removal      Kaley Fowler MD    [facility] Urology  [address] [facility]

## 2022-11-17 ENCOUNTER — TELEPHONE (OUTPATIENT)
Dept: ONCOLOGY | Age: 72
End: 2022-11-17

## 2022-11-17 ENCOUNTER — CARE COORDINATION (OUTPATIENT)
Dept: CARE COORDINATION | Facility: CLINIC | Age: 72
End: 2022-11-17

## 2022-11-17 NOTE — CARE COORDINATION
[facility] Care Transitions Initial Follow Up Call    Call within 2 business days of discharge: Yes    LPN Care Coordinator contacted the patient by telephone to perform post hospital discharge assessment. Verified name and  with patient as identifiers. Provided introduction to self, and explanation of the LPN Care Coordinator role. Patient: Georgia Stuot Patient : 1950   MRN: [id number]  Reason for Admission: Prostate Cancer  Discharge Date: 22 RARS: No data recorded    Last Discharge  Street       Date Complaint Diagnosis Description Type Department Provider    11/15/22  Prostate cancer [facility]) Admission (Discharged) [id number] Roderick Nielsen MD            Was this an external facility discharge? No Discharge Facility: [id number] to be reviewed by the provider   Additional needs identified to be addressed with provider: No  none               Method of communication with provider: none. Spoke with patient states fine. Patient denies any signs of infections @ incision site. Reminded patient of follow up appointment on 2022    LPN Care Coordinator reviewed discharge instructions with patient ,who verbalized understanding. The patient was given an opportunity to ask questions and does not have any further questions or concerns at this time. Were discharge instructions available to patient? Yes. Reviewed appropriate site of care based on symptoms and resources available to patient including: PCP  Specialist  When to call 911. The patient agrees to contact the PCP office for questions related to their healthcare. Advance Care Planning:   Does patient have an Advance Directive: decision maker updated. Medication reconciliation was performed with patient, who verbalizes understanding of administration of home medications.  Medications reviewed, 1111F entered: N/A    Was patient discharged with a pulse oximeter? no    Non-face-to-face services provided:  Obtained and reviewed discharge summary and/or continuity of care documents    Offered patient enrollment in the Remote Patient Monitoring (RPM) program for in-home monitoring: NA.    Care Transitions 24 Hour Call    Do you have a copy of your discharge instructions?: Yes  Do you have all of your prescriptions and are they filled?: Yes  Have you been contacted by a [facility] Pharmacist?: No  Have you scheduled your follow up appointment?: Yes (Comment: Dr. Tim Colin 11/28/2022)  How are you going to get to your appointment?: Car - family or friend to transport  Do you have support at home?: Partner/Spouse/SO  Do you feel like you have everything you need to keep you well at home?: Yes  Are you an active caregiver in your home?: No  Care Transitions Interventions  No Identified Needs         Follow Up  Future Appointments   Date Time Provider Lyn Jensen   11/28/2022 11:00 AM Helena Adkins MD [facility]   4/13/2023  9:15 AM DO ANN Rico [facility] AMB      Goals Addressed                      This Visit's Progress      Attends follow up appointments on schedule (pt-stated)         Patient states will attend follow up appointment and will follow recommendations of plan of care               LPN Care Coordinator provided contact information. Plan for follow-up call in 5-7 days based on severity of symptoms and risk factors. Plan for next call: follow-up appointment-discuss any questions with follow up plan of care.     Rodrigo Ramos LPN

## 2022-11-17 NOTE — TELEPHONE ENCOUNTER
Call to pt. Pt is doing well post op. Does state he is sore, which is to be expected. Pt has removed bandages from incision sites and states they look well. Reviewed with pt about signs/symptoms of infection. Pt states catheter is draining well and is aware of upcoming appt. Pt was appreciative of phone call. Instructed pt to return call with any questions/concerns.  Laurel Azar

## 2022-11-23 NOTE — PROGRESS NOTES
[facility] Oncology  [address] [address]  [phone]        Mr. Live Marcum is a 71 y.o. male with a diagnosis of prostate cancer. S/p RALP on 11/15/22, Yadi score 3+4=7 prostate cancer, pT3bN0 (0/13). INTERVAL HISTORY: Patient is here today for follow-up after his robotic prostatectomy on 11/15/2022. He has no complaints. His energy level is returned to normal.  His bowels are functioning normally. He denies any pain. He has not had any hematuria. He denies any lower extremity edema or tenderness. From previous note(s):  Patient is here today for follow-up after his MRI fusion prostate biopsy on 10/27/2022. He was found to have Wilmot 4+3 in a total of 5 cores including region of interest #3. He also had Wilmot 3+3 equal 6 in 2 additional cores. His initial PSA was 5.9. His digital rectal exam showed bilateral induration, right greater than left. His prostate measured 43 cc. His prostate MRI showed a 2 cm PI-RADS 5 lesion as well as a PI-RADS 3 lesion. He is quite fit for 71year-old. 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 He currently walks 2 to 3 miles daily. He has no prior abdominal surgical history. His father lived to age 80. His mother is still alive at age 80. His IPSS symptom score today is 14. He is mostly satisfied from a quality-of-life standpoint. His evan score is 18. From previous note:  Patient is here today for follow-up after his prostate MRI. It was performed on 10/3/2022. It showed a 2 cm x 1 cm lesion in the bilateral anterior mid gland down to the apex of the transition zone. It was a PI-RADS 5 lesion. He also had a 1.4 cm lesion in the right anterior mid gland that was a PI-RADS 3 lesion. There was some suspicious enhancement in the distal right seminal vesicle that was concerning as well.   There was no pelvic adenopathy or bony lesions noted. He has had no changes in his overall health. His digital rectal exam was normal at the last appointment. His PSA was up to 5.9 when we last saw him. He has no known drug allergies. His only anticoagulant is 81 mg of aspirin daily. From previous note:  Justo Luo is a 71 y.o. male with Elevated PSA. Mr. Alex Calderon lives in [place]. They spend a lot of time at the [place] as well. He is  to my 6 . His primary care doctor checked a PSA and noted it was elevated at 4.1. In 12/20/2020 it was 2.2. At the time he also had some lower urinary tract symptoms primarily frequency urgency and nocturia. His urine was cultured and he did not have a UTI. He was started on Flomax and he states that his symptoms have improved greatly. He has no family history of prostate cancer. His mother had colon cancer and a paternal aunt had breast cancer. 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 He is done well since then. It was treated with chemoradiation. He also has a history of hypertension GERD CAD sleep apnea. Past surgical history includes multiple knee surgeries and a cardiac catheterization. Past medical, family and social histories, as well as medications and allergies, were reviewed and updated in the medical record as appropriate.     PMH:     Past Medical History:   Diagnosis Date    Abnormal EKG     Arthritis     osteo    Asbestosis ([facility] [address]     CAD (coronary artery disease)     Cancer ([facility] [address] 2009    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     Chest pain, precordial     Chronic pain     knee    Coronary atherosclerosis of native coronary artery 4/13/2016    Dry mouth     since cancer treatment    GERD (gastroesophageal reflux disease)     controlled w/med    History of pneumonia 2007    Hypercholesteremia      diet controlled    Hypertension     controlled w/med Malaise and fatigue     S/P total knee replacement using cement 11/25/2013    Shingles 2007    Sleep apnea     does not use or have CPAP anymore - sleeping good since 30-35 weight loss 2011 per pt    Status post total left knee replacement 12/30/2015       MEDs:     ascorbic acid  aspirin  atorvastatin  COQ10 PO  loratadine  MAGNESIUM CARBONATE PO  omeprazole  POTASSIUM CITRATE PO  sennosides-docusate sodium  sulfamethoxazole-trimethoprim  Vitamin D3 Caps  ZINC PO     ALLERGIES:    Allergies   Allergen Reactions    Rosuvastatin Myalgia       ROS:     Review of Systems   Constitutional: Negative. Negative for chills, fatigue and fever. Respiratory: Negative. Cardiovascular: Negative. Gastrointestinal: Negative. Genitourinary: Negative. Negative for difficulty urinating, dysuria, flank pain, frequency, hematuria and urgency. Musculoskeletal: Negative. All other systems reviewed and are negative. PHYSICAL EXAMINATION    There were no vitals taken for this visit. General: well dressed, well nourished, no acute distress  Skin: no rashes  HEENT: Sclera are clear,normocephalic, atraumatic. no external lesions   Cardiovascular: Reg. Normal perfusion  Respiratory: normal respiratory effort, no JVD, no audible wheezing. Musculoskeletal: unremarkable with normal function. No embolic signs or cyanosis.    Neurologic exam: intact, no focal deficits, moves all 4 extremities  Psych: normal mood and affect, alert, oriented x 3  LE:  no edema  GI: soft, nontender, no masses, no CVA tenderness  : DEFERRED       LABORATORY RESULTS:    Lab Results   Component Value Date/Time    PSA 5.9 08/19/2022 09:37 AM    PSA 2.2 12/28/2020 09:47 AM    PSA 1.8 12/23/2019 02:04 PM          IMAGING:    CT Results:    === 01/25/19 ===    CTA CHEST W WO CONTRAST    - Narrative -  EXAM:  CTA CHEST W OR W WO CONT    INDICATION:  Thoracic aortic aneurysm    COMPARISON:  July 18, 2017    TECHNIQUE:  Multislice helical CT angiography of the chest was performed during uneventful  rapid bolus intravenous administration of contrast. Coronal and sagittal  reformations and MIP images and 3D shaded surface display renderings were  generated. CT dose reduction was achieved through use of a standardized  protocol tailored for this examination and automatic exposure control for dose  modulation. FINDINGS:  Neck base: Normal  Lungs: Small hazy focus in the right upper lobe is not significantly changed. A  few cysts/blebs are present. Mediastinum: Normal.  Cardiac: Normal.  Esophagus: Normal  Upper abdomen: No acute process. Bones: Normal.  CHEST WALL: Normal    Vasculature: Aorta: Mild enlargement of the ascending thoracic aorta measures 4.3 x 4.3 cm,  similar to the previous study. Mild atherosclerotic changes. There is a bovine  arch. The arch measures 2.9 cm. The descending aorta measures 2.6 cm. Other: Visualized great vessels are patent. Possible proximal right vertebral  artery stenosis again noted. - Impression -  IMPRESSION:Stable ascending aortic enlargement. MRI Results:    === 10/03/22 ===    MRI PROSTATE W WO CONTRAST    - Narrative -  MRI PELVIS WITHOUT AND WITH CONTRAST, 10/3/2022    History: Elevated PSA. Technique: Axial, sagittal, coronal T2; axial diffusion-weighted with calculated  ADC map; axial fat-saturated T1 images were followed by followed by 20 cc  intravenous ProHance, following which multiplanar fat-saturated T1 weighted  imaging to include multiphase axial images were obtained. Findings:  The prostate gland measures: 5.1 cm transverse by 4 cm AP by 4.7 cm  craniocaudal.  The most recent PSA level at this institution is dated 8/19/2022  measured at 5.9 ng/mL. This calculates to a PSA density of 0.118 ng/mL/cc. Peripheral Zone: There is an ill-defined T2 hypointense lesion involving the  right anterior mid gland/apex peripheral zone seen on axial T2-weighted image  16.   This demonstrates associated marked signal loss on ADC map images although  only moderate increased signal is seen on high be evaluated diffusion-weighted  images. No clear early focal enhancement is seen at this level. There is an  additional 1.4 cm x 0.9 cm lesion involving the left anterior and posterior base  peripheral zone seen on axial T2-weighted image 9. However, this only  demonstrates mild to moderate associated diffusion-weighted imaging signal  changes, and no early focal enhancement is seen. Central Gland: There is a lentiform lesion occupying the anterior mid gland and  apex bridging the anterior left and right changes in zones measuring 2 cm x 1 cm  in greatest transverse dimensions. This demonstrates moderate to severe patchy  diffusion-weighted imaging signal changes. In addition, this demonstrates clear  postcontrast enhancement and therefore is not felt to represent benign anterior  fibromuscular stroma. Clinically significant tumor is not excluded. No  distinct Central gland lesion is otherwise seen. Prostate capsule: No suspicious focal contour change, or gross extracapsular  enhancing tumor. Seminal vesicles: Enhancement and diffusion-weighted imaging signal changes are  seen in the more distal right seminal vesicle best appreciated on  diffusion-weighted images 5 and axial postcontrast image 53. The appearance  raises concern for involvement of the distal right seminal vesicle by tumor. Neurovascular bundles: No clear evidence for involvement. Lymph nodes: No pelvic lymphadenopathy. Bones: No suspicious enhancing osseous lesion. Other:    - Impression -  1. Peripheral zone changes as follows:  -1.4 cm x 0.9 cm lesion in the right anterior mid gland/apex peripheral zone:  PIRADS 3    2. Central gland changes as follows:  -2 cm x 1 cm lesion involving the bilateral anterior mid gland and apex  transition zone: PIRADS 5    3.  Suspicious enhancement in diffusion-weighted imaging signal changes in the  distal right seminal vesicle concerning for involvement of the distal right  seminal vesicle by potential prostate tumor. 4.  No findings concerning for metastatic lesions in the visualized pelvis. ASSESSMENT:    Mr. Jeff Portillo is a 71 y.o. male with a diagnosis of Prostate Cancer, s/p RALP on 11/15/22, shalini score 3+4=7. We discussed his pathology in detail. His pathologic stage was pT3bN0. We discussed his risk of recurrence. At this point we are going to follow him closely with ultrasensitive PSA. He understands there could be a need for adjuvant or salvage radiation therapy in the future. Removed his Kang catheter today without difficulty. He will see me in 4 weeks with an ultrasensitive PSA. PLAN:   -removed kang without complication  -review final pathology   -rtc in 4 weeks with uPSA     ________________________________________      I have seen and examined this patient. I have reviewed and edited the note started by the MA and agree with the outlined plan. Part of this note was written by using a voice dictation software. The note has been proof read but may still contain some grammatical/other typographical errors.       Eileen Rothman  Urology

## 2022-11-25 ENCOUNTER — CARE COORDINATION (OUTPATIENT)
Dept: CARE COORDINATION | Facility: CLINIC | Age: 72
End: 2022-11-25

## 2022-11-25 NOTE — CARE COORDINATION
Care Transitions Outreach Attempt    Call within 2 business days of discharge: Yes   Attempted to reach patient for transitions of care follow up. Unable to reach patient. Will attempt to contact patient next business day. Patient: Maria Dolores Garcia Patient : 1950 MRN: [id number]    Last Discharge  Street       Date Complaint Diagnosis Description Type Department Provider    11/15/22  Prostate cancer [facility]) Admission (Discharged) [id number] Anell Leyden, MD              Was this an external facility discharge?  No Discharge Facility: [facility]    Noted following upcoming appointments from discharge chart review:   [facility] follow up appointment(s):   Future Appointments   Date Time Provider Lyn Jensen   2022 11:00 AM Anell Leyden, MD [facility] AM[facility]   2023  9:15 AM Omar García DO [facility] AMB     Non-[facility] follow up appointment(s): n/a

## 2022-11-28 ENCOUNTER — CARE COORDINATION (OUTPATIENT)
Dept: CARE COORDINATION | Facility: CLINIC | Age: 72
End: 2022-11-28

## 2022-11-28 ENCOUNTER — OFFICE VISIT (OUTPATIENT)
Dept: ONCOLOGY | Age: 72
End: 2022-11-28

## 2022-11-28 VITALS
WEIGHT: 215.1 LBS | OXYGEN SATURATION: 97 % | HEART RATE: 74 BPM | RESPIRATION RATE: 18 BRPM | TEMPERATURE: 98 F | SYSTOLIC BLOOD PRESSURE: 148 MMHG | DIASTOLIC BLOOD PRESSURE: 80 MMHG | BODY MASS INDEX: 30.11 KG/M2 | HEIGHT: 71 IN

## 2022-11-28 DIAGNOSIS — C61 PROSTATE CANCER (HCC): Primary | ICD-10-CM

## 2022-11-28 PROCEDURE — 99024 POSTOP FOLLOW-UP VISIT: CPT | Performed by: UROLOGY

## 2022-11-28 ASSESSMENT — PATIENT HEALTH QUESTIONNAIRE - PHQ9
SUM OF ALL RESPONSES TO PHQ QUESTIONS 1-9: 0
SUM OF ALL RESPONSES TO PHQ QUESTIONS 1-9: 0
2. FEELING DOWN, DEPRESSED OR HOPELESS: 0
SUM OF ALL RESPONSES TO PHQ QUESTIONS 1-9: 0
SUM OF ALL RESPONSES TO PHQ QUESTIONS 1-9: 0
SUM OF ALL RESPONSES TO PHQ9 QUESTIONS 1 & 2: 0
1. LITTLE INTEREST OR PLEASURE IN DOING THINGS: 0

## 2022-11-28 ASSESSMENT — ENCOUNTER SYMPTOMS
GASTROINTESTINAL NEGATIVE: 1
RESPIRATORY NEGATIVE: 1

## 2022-11-28 NOTE — CARE COORDINATION
[facility] Care Transitions Follow Up Call    LPN Care Coordinator contacted the patient by telephone to follow up after admission on 11/15/2022. Verified name and  with patient as identifiers. Patient: Maria Dolores Garcia  Patient : 1950   MRN: [id number]  Reason for Admission: Prostate Cancer  Discharge Date: 22 RARS: No data recorded    Needs to be reviewed by the provider   Additional needs identified to be addressed with provider: No  none             Method of communication with provider: none. Spoke with patient states doing fine. Patient states attended  Oncology appointment 2022 and Catheter was removed. Addressed changes since last contact:  none  Discussed follow-up appointments. If no appointment was previously scheduled, appointment scheduling offered: Yes. Is follow up appointment scheduled within 7 days of discharge? No.    Follow Up  Future Appointments   Date Time Provider Lyn Jensen   2023  8:10 AM Francine [address] [place] [address]   2023  8:45 AM Anell Leyden, MD [facility] AM[facility]   2023  9:15 AM Omar García DO [facility] AMB     Non-[facility] follow up appointment(s):     LPN Care Coordinator reviewed medical action plan with patient and discussed any barriers to care and/or understanding of plan of care after discharge. Discussed appropriate site of care based on symptoms and resources available to patient including: Specialist  When to call 911. The patient agrees to contact the PCP office for questions related to their healthcare. Advance Care Planning:   decision maker updated.      Patients top risk factors for readmission: medical condition-Prostate Cancer  Interventions to address risk factors: Assessment and support for treatment adherence and medication management-     Offered patient enrollment in the Remote Patient Monitoring (RPM) program for in-home monitoring: NA.     Care Transitions Subsequent and Final Call    Subsequent and Final Calls  Do you have any ongoing symptoms?: No  Have your medications changed?: No  Do you have any questions related to your medications?: No  Do you currently have any active services?: No  Do you have any needs or concerns that I can assist you with?: No  Identified Barriers: None  Care Transitions Interventions  Other Interventions:            Goals Addressed                      This Visit's Progress      Attends follow up appointments on schedule (pt-stated)   On track      Patient states will attend follow up appointment and will follow recommendations of plan of care               LPN Care Coordinator provided contact information for future needs. Plan for follow-up call in 10-14 days based on severity of symptoms and risk factors. Plan for next call: self management-Discuss questions with plan of care.     Rodrigo Ramos LPN

## 2022-11-28 NOTE — PATIENT INSTRUCTIONS
Patient Instructions from Today's Visit    Reason for Visit:  Follow up, post op     Diagnosis Information:  [email or web address]      Plan: Your final pathology does show prostate cancer with a shalini score of 3+4=7. There was some involvement of the seminal vesicle. However at this time we will not do anything additional in regards to follow up. We will remove the kang catheter today. We will plan to check your first post op PSA in about 4 weeks. Follow Up:  4 weeks with labs prior     Recent Lab Results:  N/a    Treatment Summary has been discussed and given to patient: n/a        -------------------------------------------------------------------------------------------------------------------    Patient does express an interest in My Chart. My Chart log in information explained on the after visit summary printout at the [facility] [address] desk.     AMADO Tipton

## 2022-12-08 ENCOUNTER — TELEPHONE (OUTPATIENT)
Dept: CARDIOLOGY CLINIC | Age: 72
End: 2022-12-08

## 2022-12-08 DIAGNOSIS — I47.1 SUPRAVENTRICULAR TACHYCARDIA (HCC): Primary | ICD-10-CM

## 2022-12-08 DIAGNOSIS — I47.1 ATRIAL TACHYCARDIA, PAROXYSMAL (HCC): ICD-10-CM

## 2022-12-08 NOTE — TELEPHONE ENCOUNTER
Pt would like to know if he needs to have an echo done before his next visit. Please call pt at your convenience. Thank you.

## 2022-12-12 ENCOUNTER — CARE COORDINATION (OUTPATIENT)
Dept: CARE COORDINATION | Facility: CLINIC | Age: 72
End: 2022-12-12

## 2022-12-12 NOTE — CARE COORDINATION
[facility] Care Transitions Follow Up Call  patient  LPN Care Coordinator contacted the  by telephone to follow up after admission on 2022. Verified name and  with patient as identifiers. Patient: Gifty Morales  Patient : 1950   MRN: [id number]  Reason for Admission: Prostate cancer  Discharge Date: 22 RARS: No data recorded    Needs to be reviewed by the provider   Additional needs identified to be addressed with provider: No  none             Method of communication with provider: none. Spoke with patient states fine. Patient denies any concerns during this phone call. This Care Coordinator will graduate this patient from this program.    Addressed changes since last contact:  none  Discussed follow-up appointments. If no appointment was previously scheduled, appointment scheduling offered: Yes. Is follow up appointment scheduled within 7 days of discharge? Yes. Follow Up  Future Appointments   Date Time Provider Lyn Jensen   2023  8:10 AM Francine [facility]   2023  8:45 AM Manoj Kelly MD [facility] AMB   2023 10:30 AM [facility] E[facility] AMB   2023  9:15 AM Ketan Fitzpatrick DO [facility] AMB     [facility] follow up appointment(s):     LPN Care Coordinator reviewed medical action plan with patient and discussed any barriers to care and/or understanding of plan of care after discharge. Discussed appropriate site of care based on symptoms and resources available to patient including: PCP  Specialist  When to call 911. The patient agrees to contact the PCP office for questions related to their healthcare. Advance Care Planning:   decision maker updated.      Patients top risk factors for readmission: medical condition-Prostate Cancer  Interventions to address risk factors: Assessment and support for treatment adherence and medication management-     Offered patient enrollment in the Remote Patient Monitoring (RPM) program for in-home monitoring: NA.     Care Transitions Subsequent and Final Call    Subsequent and Final Calls  Do you have any ongoing symptoms?: No  Have your medications changed?: No  Do you have any questions related to your medications?: No  Do you currently have any active services?: No  Do you have any needs or concerns that I can assist you with?: No  Identified Barriers: None  Care Transitions Interventions  Other Interventions:             LPN Care Coordinator provided contact information for future needs. No further follow-up call indicated based on severity of symptoms and risk factors.       Kesha Cantu LPN

## 2023-01-05 DIAGNOSIS — C61 PROSTATE CANCER (HCC): Primary | ICD-10-CM

## 2023-01-05 NOTE — PROGRESS NOTES
[facility]  [address] [address]  [phone]        Mr. Cornelia Blair is a 71 y.o. male with a diagnosis of prostate cancer. S/p RALP on 11/15/22, Mapleton score 3+4=7 prostate cancer, pT3bN0 (0/13). .     INTERVAL HISTORY: Patient is here today for follow-up after his robotic radical prostatectomy on 11/15/2022. He has no complaints. He is currently wearing no pads and states he has no stress urinary incontinence. He only wore pads for approximately 2 weeks. He is voiding with a strong stream.  He denies any hematuria or dysuria. His ultrasensitive PSA is pending. From previous note:   Patient is here today for follow-up after his robotic prostatectomy on 11/15/2022. He has no complaints. His energy level is returned to normal.  His bowels are functioning normally. He denies any pain. He has not had any hematuria. He denies any lower extremity edema or tenderness. From previous note(s):  Patient is here today for follow-up after his MRI fusion prostate biopsy on 10/27/2022. He was found to have Mapleton 4+3 in a total of 5 cores including region of interest #3. He also had Mapleton 3+3 equal 6 in 2 additional cores. His initial PSA was 5.9. His digital rectal exam showed bilateral induration, right greater than left. His prostate measured 43 cc. His prostate MRI showed a 2 cm PI-RADS 5 lesion as well as a PI-RADS 3 lesion. He is quite fit for 71year-old. 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 He currently walks 2 to 3 miles daily. He has no prior abdominal surgical history. His father lived to age 80. His mother is still alive at age 80. His IPSS symptom score today is 14. He is mostly satisfied from a quality-of-life standpoint. His evan score is 18. From previous note:  Patient is here today for follow-up after his prostate MRI. It was performed on 10/3/2022. It showed a 2 cm x 1 cm lesion in the bilateral anterior mid gland down to the apex of the transition zone. It was a PI-RADS 5 lesion. He also had a 1.4 cm lesion in the right anterior mid gland that was a PI-RADS 3 lesion. There was some suspicious enhancement in the distal right seminal vesicle that was concerning as well. There was no pelvic adenopathy or bony lesions noted. He has had no changes in his overall health. His digital rectal exam was normal at the last appointment. His PSA was up to 5.9 when we last saw him. He has no known drug allergies. His only anticoagulant is 81 mg of aspirin daily. From previous note:  Ginger Anthony is a 71 y.o. male with Elevated PSA. Mr. Linda Barrios lives in [place]. They spend a lot of time at the beach as well. He is  to my 6 . His primary care doctor checked a PSA and noted it was elevated at 4.1. In 12/20/2020 it was 2.2. At the time he also had some lower urinary tract symptoms primarily frequency urgency and nocturia. His urine was cultured and he did not have a UTI. He was started on Flomax and he states that his symptoms have improved greatly. He has no family history of prostate cancer. His mother had colon cancer and a paternal aunt had breast cancer. 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 He is done well since then. It was treated with chemoradiation. He also has a history of hypertension GERD CAD sleep apnea. Past surgical history includes multiple knee surgeries and a cardiac catheterization. Past medical, family and social histories, as well as medications and allergies, were reviewed and updated in the medical record as appropriate.     PMH:     Past Medical History:   Diagnosis Date    Abnormal EKG     Arthritis     osteo    Asbestosis ([address]     CAD (coronary artery disease)     Cancer ([facility] [address] 2009    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     Chest pain, precordial     Chronic pain     knee    Coronary atherosclerosis of native coronary artery 4/13/2016    Dry mouth     since cancer treatment    GERD (gastroesophageal reflux disease)     controlled w/med    History of pneumonia 2007    Hypercholesteremia      diet controlled    Hypertension     controlled w/med    Malaise and fatigue     S/P total knee replacement using cement 11/25/2013    Shingles 2007    Sleep apnea     does not use or have CPAP anymore - sleeping good since 30-35 weight loss 2011 per pt    Status post total left knee replacement 12/30/2015       MEDs:     ascorbic acid  aspirin  atorvastatin  COQ10 PO  loratadine  MAGNESIUM CARBONATE PO  omeprazole  POTASSIUM CITRATE PO  sennosides-docusate sodium  Vitamin D3 Caps  ZINC PO     ALLERGIES:    Allergies   Allergen Reactions    Rosuvastatin Myalgia       ROS:     Review of Systems   Constitutional: Negative. Negative for chills, fatigue and fever. Respiratory: Negative. Cardiovascular: Negative. Gastrointestinal: Negative. Genitourinary: Negative. Negative for difficulty urinating, dysuria, flank pain, frequency, hematuria and urgency. Musculoskeletal: Negative. All other systems reviewed and are negative. PHYSICAL EXAMINATION    BP (!) 154/92 (Site: Left Upper Arm, Position: Standing, Cuff Size: Medium Adult)   Pulse 96   Temp 98.3 °F (36.8 °C) (Oral)   Resp 16   Ht 5' 11\" (1.803 m)   Wt 211 lb 8 oz (95.9 kg)   SpO2 96%   BMI 29.50 kg/m²   General: well dressed, well nourished, no acute distress  Skin: no rashes  HEENT: Sclera are clear,normocephalic, atraumatic. no external lesions   Cardiovascular: Reg. Normal perfusion  Respiratory: normal respiratory effort, no JVD, no audible wheezing. Musculoskeletal: unremarkable with normal function. No embolic signs or cyanosis.    Neurologic exam: intact, no focal deficits, moves all 4 extremities  Psych: normal mood and affect, alert, oriented x 3  LE:  no edema  GI: soft, nontender, no masses, no CVA tenderness  : Incisions are all well-healed without hernia. LABORATORY RESULTS:    Lab Results   Component Value Date/Time    PSA 5.9 08/19/2022 09:37 AM    PSA 2.2 12/28/2020 09:47 AM    PSA 1.8 12/23/2019 02:04 PM              IMAGING:      CT Results:    === 01/25/19 ===    CTA CHEST W WO CONTRAST    - Narrative -  EXAM:  CTA CHEST W OR W WO CONT    INDICATION:  Thoracic aortic aneurysm    COMPARISON:  July 18, 2017    TECHNIQUE:  Multislice helical CT angiography of the chest was performed during uneventful  rapid bolus intravenous administration of contrast. Coronal and sagittal  reformations and MIP images and 3D shaded surface display renderings were  generated. CT dose reduction was achieved through use of a standardized  protocol tailored for this examination and automatic exposure control for dose  modulation. FINDINGS:  Neck base: Normal  Lungs: Small hazy focus in the right upper lobe is not significantly changed. A  few cysts/blebs are present. Mediastinum: Normal.  Cardiac: Normal.  Esophagus: Normal  Upper abdomen: No acute process. Bones: Normal.  CHEST WALL: Normal    Vasculature: Aorta: Mild enlargement of the ascending thoracic aorta measures 4.3 x 4.3 cm,  similar to the previous study. Mild atherosclerotic changes. There is a bovine  arch. The arch measures 2.9 cm. The descending aorta measures 2.6 cm. Other: Visualized great vessels are patent. Possible proximal right vertebral  artery stenosis again noted. - Impression -  IMPRESSION:Stable ascending aortic enlargement. MRI Results:    === 10/03/22 ===    MRI PROSTATE W WO CONTRAST    - Narrative -  MRI PELVIS WITHOUT AND WITH CONTRAST, 10/3/2022    History: Elevated PSA.     Technique: Axial, sagittal, coronal T2; axial diffusion-weighted with calculated  ADC map; axial fat-saturated T1 images were followed by followed by 20 cc  intravenous ProHance, following which multiplanar fat-saturated T1 weighted  imaging to include multiphase axial images were obtained. Findings:  The prostate gland measures: 5.1 cm transverse by 4 cm AP by 4.7 cm  craniocaudal.  The most recent PSA level at this institution is dated 8/19/2022  measured at 5.9 ng/mL. This calculates to a PSA density of 0.118 ng/mL/cc. Peripheral Zone: There is an ill-defined T2 hypointense lesion involving the  right anterior mid gland/apex peripheral zone seen on axial T2-weighted image  16. This demonstrates associated marked signal loss on ADC map images although  only moderate increased signal is seen on high be evaluated diffusion-weighted  images. No clear early focal enhancement is seen at this level. There is an  additional 1.4 cm x 0.9 cm lesion involving the left anterior and posterior base  peripheral zone seen on axial T2-weighted image 9. However, this only  demonstrates mild to moderate associated diffusion-weighted imaging signal  changes, and no early focal enhancement is seen. Central Gland: There is a lentiform lesion occupying the anterior mid gland and  apex bridging the anterior left and right changes in zones measuring 2 cm x 1 cm  in greatest transverse dimensions. This demonstrates moderate to severe patchy  diffusion-weighted imaging signal changes. In addition, this demonstrates clear  postcontrast enhancement and therefore is not felt to represent benign anterior  fibromuscular stroma. Clinically significant tumor is not excluded. No  distinct Central gland lesion is otherwise seen. Prostate capsule: No suspicious focal contour change, or gross extracapsular  enhancing tumor. Seminal vesicles: Enhancement and diffusion-weighted imaging signal changes are  seen in the more distal right seminal vesicle best appreciated on  diffusion-weighted images 5 and axial postcontrast image 53.   The appearance  raises concern for involvement of the distal right seminal vesicle by tumor. Neurovascular bundles: No clear evidence for involvement. Lymph nodes: No pelvic lymphadenopathy. Bones: No suspicious enhancing osseous lesion. Other:    - Impression -  1. Peripheral zone changes as follows:  -1.4 cm x 0.9 cm lesion in the right anterior mid gland/apex peripheral zone:  PIRADS 3    2. Central gland changes as follows:  -2 cm x 1 cm lesion involving the bilateral anterior mid gland and apex  transition zone: PIRADS 5    3. Suspicious enhancement in diffusion-weighted imaging signal changes in the  distal right seminal vesicle concerning for involvement of the distal right  seminal vesicle by potential prostate tumor. 4.  No findings concerning for metastatic lesions in the visualized pelvis. ASSESSMENT:    Mr. Beuford Gaucher is a 71 y.o. male with a diagnosis of prostate cancer. S/p RALP on 11/15/22, [place] score 3+4=7 prostate cancer, pT3bN0 (0/13). He is recovered quite well and I am very pleased with his urinary function at this point. We will discuss in more detail erectile function and see if he wants a trial of sildenafil at his next appointment. Assuming his ultrasensitive PSA is low I will see him back in 2 to 3 months with a repeat ultrasensitive PSA. PLAN:   -uPSA today  -RTC in 3 months with repeat labs    ________________________________________      I have seen and examined this patient. I have reviewed and edited the note started by the MA and agree with the outlined plan. Part of this note was written by using a voice dictation software. The note has been proof read but may still contain some grammatical/other typographical errors.       Eileen Car  Urology

## 2023-01-09 ENCOUNTER — OFFICE VISIT (OUTPATIENT)
Dept: ONCOLOGY | Age: 73
End: 2023-01-09

## 2023-01-09 ENCOUNTER — HOSPITAL ENCOUNTER (OUTPATIENT)
Dept: LAB | Age: 73
Discharge: HOME | End: 2023-01-12
Payer: MEDICARE

## 2023-01-09 VITALS
BODY MASS INDEX: 29.61 KG/M2 | HEIGHT: 71 IN | TEMPERATURE: 98.3 F | OXYGEN SATURATION: 96 % | HEART RATE: 96 BPM | DIASTOLIC BLOOD PRESSURE: 92 MMHG | WEIGHT: 211.5 LBS | RESPIRATION RATE: 16 BRPM | SYSTOLIC BLOOD PRESSURE: 154 MMHG

## 2023-01-09 DIAGNOSIS — C61 PROSTATE CANCER (HCC): ICD-10-CM

## 2023-01-09 DIAGNOSIS — C61 PROSTATE CANCER (HCC): Primary | ICD-10-CM

## 2023-01-09 PROCEDURE — 99024 POSTOP FOLLOW-UP VISIT: CPT | Performed by: UROLOGY

## 2023-01-09 PROCEDURE — 84153 ASSAY OF PSA TOTAL: CPT

## 2023-01-09 PROCEDURE — 36415 COLL VENOUS BLD VENIPUNCTURE: CPT

## 2023-01-09 ASSESSMENT — ENCOUNTER SYMPTOMS
GASTROINTESTINAL NEGATIVE: 1
RESPIRATORY NEGATIVE: 1

## 2023-01-09 ASSESSMENT — PATIENT HEALTH QUESTIONNAIRE - PHQ9
SUM OF ALL RESPONSES TO PHQ9 QUESTIONS 1 & 2: 0
1. LITTLE INTEREST OR PLEASURE IN DOING THINGS: 0
SUM OF ALL RESPONSES TO PHQ QUESTIONS 1-9: 0
2. FEELING DOWN, DEPRESSED OR HOPELESS: 0
SUM OF ALL RESPONSES TO PHQ QUESTIONS 1-9: 0

## 2023-01-09 NOTE — PATIENT INSTRUCTIONS
Patient Instructions from Today's Visit    Reason for Visit:  Follow up     Diagnosis Information:  [email or web address]      Plan: We are checking your psa today. The hope is that it is undetectable. We are glad to hear you are doing well. Follow Up: We would like to see you back in three months with repeat labs. Recent Lab Results:  N/a    Treatment Summary has been discussed and given to patient:   N/a      -------------------------------------------------------------------------------------------------------------------    Patient does express an interest in My Chart. My Chart log in information explained on the after visit summary printout at the [facility] desk.     AMADO Bhandari

## 2023-01-10 LAB — PSA SERPL DL<=0.01 NG/ML-MCNC: 0.01 NG/ML (ref 0–4)

## 2023-01-24 ENCOUNTER — TELEPHONE (OUTPATIENT)
Dept: CARDIOLOGY CLINIC | Age: 73
End: 2023-01-24

## 2023-01-24 RX ORDER — LISINOPRIL 10 MG/1
10 TABLET ORAL 2 TIMES DAILY
Qty: 180 TABLET | Refills: 3 | Status: SHIPPED | OUTPATIENT
Start: 2023-01-24

## 2023-01-24 NOTE — TELEPHONE ENCOUNTER
,  He has an ECHO and fu in April. Should I move all this up or just have the BP check next week and keep appt.'s as scheduled and not do aMarch appt. ?

## 2023-01-24 NOTE — TELEPHONE ENCOUNTER
Yes, take 10 BID for now. Focus on low salt diet. Call for angina, LABOY. Nurse office BP check late next week with home machine. See me in 4-6 weeks, call if not getting better.   Thanks

## 2023-01-24 NOTE — TELEPHONE ENCOUNTER
Note sent to scheduling to move up ECHO and fu appt. to end of Feb.1st of March.       Requested Prescriptions     Signed Prescriptions Disp Refills    lisinopril (PRINIVIL;ZESTRIL) 10 MG tablet 180 tablet 3     Sig: Take 1 tablet by mouth in the morning and at bedtime     Authorizing Provider: Kia Galvan     Ordering User: MANJULA SAINI

## 2023-01-24 NOTE — TELEPHONE ENCOUNTER
Patient called stated that over the past two /weeks his BP has been running high (170/90) range. Please review and follow up.  Thanks

## 2023-01-24 NOTE — TELEPHONE ENCOUNTER
Was at the beach last week and started feeling funny. /90-low 100 and then dropped back to 140's range but this am it has shot back up again to 160/85. No SBP lower than 140 in a while. He is currently not on any BP meds. He use to take Lisinopril 10mg daily has a old bottle at home. Should he start back on this med?

## 2023-02-02 ENCOUNTER — NURSE ONLY (OUTPATIENT)
Dept: CARDIOLOGY CLINIC | Age: 73
End: 2023-02-02

## 2023-02-02 ENCOUNTER — TELEPHONE (OUTPATIENT)
Dept: CARDIOLOGY CLINIC | Age: 73
End: 2023-02-02

## 2023-02-02 VITALS — SYSTOLIC BLOOD PRESSURE: 158 MMHG | DIASTOLIC BLOOD PRESSURE: 82 MMHG | HEART RATE: 72 BPM

## 2023-02-02 NOTE — TELEPHONE ENCOUNTER
Pt in for BP check/nurse visit. He forgot to bring in his home BP machine to verify accuracy. BP in office today is 158/82, P-72. He stated his BP has been labile at home since restarting the Lisinopril 10. His BP dropped down to 61/43 on second day of taking the 10 mg so he started taking 1/2 pill. His BP is still fluctuating from 108/54 to highest of 147/84. He states he now checks his BP in the morning and only takes 1/2 the pill if it is elevated (140 range). He also states he can tell when it is low because he gets very dizzy. Please advise. Thanks!

## 2023-02-23 DIAGNOSIS — C61 PROSTATE CANCER (HCC): Primary | ICD-10-CM

## 2023-02-23 NOTE — PROGRESS NOTES
[facility]  [address] [facility], [address] [place] [address]  [phone]        Mr. Renny Torres is a 71 y.o. male with a diagnosis of prostate cancer. S/p RALP on 11/15/22, Cleveland score 3+4=7 prostate cancer, pT3bN0 (0/13). INTERVAL HISTORY: Patient is here for follow-up. He underwent a robotic prostatectomy on 11/15/2022 for a Yadi 7, pT3bN0. His first ultrasensitive PSA on 1/9/2023 was 0.014. He continues to do well from a urinary function standpoint. He is not using any pads. He voids with a strong stream.      From previous note(s):   Patient is here today for follow-up after his robotic radical prostatectomy on 11/15/2022. He has no complaints. He is currently wearing no pads and states he has no stress urinary incontinence. He only wore pads for approximately 2 weeks. He is voiding with a strong stream.  He denies any hematuria or dysuria. His ultrasensitive PSA is pending. From previous note:   Patient is here today for follow-up after his robotic prostatectomy on 11/15/2022. He has no complaints. His energy level is returned to normal.  His bowels are functioning normally. He denies any pain. He has not had any hematuria. He denies any lower extremity edema or tenderness. From previous note(s):  Patient is here today for follow-up after his MRI fusion prostate biopsy on 10/27/2022. He was found to have Cleveland 4+3 in a total of 5 cores including region of interest #3. He also had Cleveland 3+3 equal 6 in 2 additional cores. His initial PSA was 5.9. His digital rectal exam showed bilateral induration, right greater than left. His prostate measured 43 cc. His prostate MRI showed a 2 cm PI-RADS 5 lesion as well as a PI-RADS 3 lesion. He is quite fit for 71year-old. 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 He currently walks 2 to 3 miles daily.   He has no prior abdominal surgical history. His father lived to age 80. His mother is still alive at age 80. His IPSS symptom score today is 14. He is mostly satisfied from a quality-of-life standpoint. His evan score is 18. From previous note:  Patient is here today for follow-up after his prostate MRI. It was performed on 10/3/2022. It showed a 2 cm x 1 cm lesion in the bilateral anterior mid gland down to the apex of the transition zone. It was a PI-RADS 5 lesion. He also had a 1.4 cm lesion in the right anterior mid gland that was a PI-RADS 3 lesion. There was some suspicious enhancement in the distal right seminal vesicle that was concerning as well. There was no pelvic adenopathy or bony lesions noted. He has had no changes in his overall health. His digital rectal exam was normal at the last appointment. His PSA was up to 5.9 when we last saw him. He has no known drug allergies. His only anticoagulant is 81 mg of aspirin daily. From previous note:  Briana Diallo is a 71 y.o. male with Elevated PSA. Mr. eTte Robledo lives in [place]. They spend a lot of time at the [place] as well. He is  to my 6 . His primary care doctor checked a PSA and noted it was elevated at 4.1. In 12/20/2020 it was 2.2. At the time he also had some lower urinary tract symptoms primarily frequency urgency and nocturia. His urine was cultured and he did not have a UTI. He was started on Flomax and he states that his symptoms have improved greatly. He has no family history of prostate cancer. His mother had colon cancer and a paternal aunt had breast cancer. 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 He is done well since then. It was treated with chemoradiation. He also has a history of hypertension GERD CAD sleep apnea. Past surgical history includes multiple knee surgeries and a cardiac catheterization.        Past medical, family and social histories, as well as medications and allergies, were reviewed and updated in the medical record as appropriate. PMH:     Past Medical History:   Diagnosis Date    Abnormal EKG     Arthritis     osteo    Asbestosis ([facility]     CAD (coronary artery disease)     Cancer ([facility] 2009    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     Chest pain, precordial     Chronic pain     knee    Coronary atherosclerosis of native coronary artery 4/13/2016    Dry mouth     since cancer treatment    GERD (gastroesophageal reflux disease)     controlled w/med    History of pneumonia 2007    Hypercholesteremia      diet controlled    Hypertension     controlled w/med    Malaise and fatigue     S/P total knee replacement using cement 11/25/2013    Shingles 2007    Sleep apnea     does not use or have CPAP anymore - sleeping good since 30-35 weight loss 2011 per pt    Status post total left knee replacement 12/30/2015       MEDs:     ascorbic acid  aspirin  atorvastatin  COQ10 PO  lisinopril  loratadine  MAGNESIUM CARBONATE PO  omeprazole  POTASSIUM CITRATE PO  sennosides-docusate sodium  Vitamin D3 Caps  ZINC PO     ALLERGIES:    Allergies   Allergen Reactions    Rosuvastatin Myalgia       ROS:     Review of Systems   Constitutional: Negative. Negative for chills, fatigue and fever. Respiratory: Negative. Cardiovascular: Negative. Gastrointestinal: Negative. Genitourinary: Negative. Negative for difficulty urinating, dysuria, flank pain, frequency, hematuria and urgency. Musculoskeletal: Negative. All other systems reviewed and are negative.      PHYSICAL EXAMINATION    BP (!) 145/87   Pulse 61   Temp 98.6 °F (37 °C) (Oral)   Resp 16   Ht 5' 11\" (1.803 m)   Wt 205 lb 14.4 oz (93.4 kg)   SpO2 96%   BMI 28.72 kg/m²   General: well dressed, well nourished, no acute distress  Skin: no rashes  HEENT: Sclera are clear,normocephalic, atraumatic. no external lesions Cardiovascular: Reg. Normal perfusion  Respiratory: normal respiratory effort, no JVD, no audible wheezing. Musculoskeletal: unremarkable with normal function. No embolic signs or cyanosis. Neurologic exam: intact, no focal deficits, moves all 4 extremities  Psych: normal mood and affect, alert, oriented x 3  LE:  no edema  GI: soft, nontender, no masses, no CVA tenderness  : Incisions well-healed no evidence of hernia. LABORATORY RESULTS:    uPSA is pending    Lab Results   Component Value Date/Time    PSA 5.9 08/19/2022 09:37 AM    PSA 2.2 12/28/2020 09:47 AM    PSA 1.8 12/23/2019 02:04 PM          ASSESSMENT:    Mr. Artis Nolan is a 71 y.o. male with a diagnosis of prostate cancer. S/p RALP on 11/15/22, [place] score 3+4=7 prostate cancer, pT3bN0 (0/13). He is recovered well. We discussed again his risk of PSA recurrence based on his surgical pathology. I explained that we will follow him closely with ultrasensitive PSA. As long as it is not rising I would plan to check it again in 6 months. If for some reason it is elevated today then we would plan to see him back in 2 to 3 months with a repeat. We discussed the potential for adjuvant or salvage radiation therapy if needed. He is currently being followed for a small lung lesion as well as an aortic aneurysm. He has routine imaging for both of those already scheduled. PLAN:   -uPSA today   -RTC in 6 months with repeat labs   ________________________________________      I have seen and examined this patient. I have reviewed and edited the note started by the MA and agree with the outlined plan. Part of this note was written by using a voice dictation software. The note has been proof read but may still contain some grammatical/other typographical errors.       Eileen Thapa [facility]

## 2023-02-28 ENCOUNTER — OFFICE VISIT (OUTPATIENT)
Dept: ONCOLOGY | Age: 73
End: 2023-02-28
Payer: MEDICARE

## 2023-02-28 ENCOUNTER — HOSPITAL ENCOUNTER (OUTPATIENT)
Dept: LAB | Age: 73
Discharge: HOME | End: 2023-03-03
Payer: MEDICARE

## 2023-02-28 VITALS
DIASTOLIC BLOOD PRESSURE: 87 MMHG | BODY MASS INDEX: 28.82 KG/M2 | SYSTOLIC BLOOD PRESSURE: 145 MMHG | WEIGHT: 205.9 LBS | HEIGHT: 71 IN | RESPIRATION RATE: 16 BRPM | OXYGEN SATURATION: 96 % | TEMPERATURE: 98.6 F | HEART RATE: 61 BPM

## 2023-02-28 DIAGNOSIS — C61 PROSTATE CANCER (HCC): ICD-10-CM

## 2023-02-28 DIAGNOSIS — C61 PROSTATE CANCER (HCC): Primary | ICD-10-CM

## 2023-02-28 PROCEDURE — 1036F TOBACCO NON-USER: CPT | Performed by: UROLOGY

## 2023-02-28 PROCEDURE — 36415 COLL VENOUS BLD VENIPUNCTURE: CPT

## 2023-02-28 PROCEDURE — 99213 OFFICE O/P EST LOW 20 MIN: CPT | Performed by: UROLOGY

## 2023-02-28 PROCEDURE — 3017F COLORECTAL CA SCREEN DOC REV: CPT | Performed by: UROLOGY

## 2023-02-28 PROCEDURE — G8484 FLU IMMUNIZE NO ADMIN: HCPCS | Performed by: UROLOGY

## 2023-02-28 PROCEDURE — 1123F ACP DISCUSS/DSCN MKR DOCD: CPT | Performed by: UROLOGY

## 2023-02-28 PROCEDURE — G8417 CALC BMI ABV UP PARAM F/U: HCPCS | Performed by: UROLOGY

## 2023-02-28 PROCEDURE — 3079F DIAST BP 80-89 MM HG: CPT | Performed by: UROLOGY

## 2023-02-28 PROCEDURE — 84153 ASSAY OF PSA TOTAL: CPT

## 2023-02-28 PROCEDURE — 3077F SYST BP >= 140 MM HG: CPT | Performed by: UROLOGY

## 2023-02-28 PROCEDURE — G8427 DOCREV CUR MEDS BY ELIG CLIN: HCPCS | Performed by: UROLOGY

## 2023-02-28 ASSESSMENT — PATIENT HEALTH QUESTIONNAIRE - PHQ9
SUM OF ALL RESPONSES TO PHQ QUESTIONS 1-9: 0
SUM OF ALL RESPONSES TO PHQ QUESTIONS 1-9: 0
2. FEELING DOWN, DEPRESSED OR HOPELESS: 0
1. LITTLE INTEREST OR PLEASURE IN DOING THINGS: 0
SUM OF ALL RESPONSES TO PHQ9 QUESTIONS 1 & 2: 0
SUM OF ALL RESPONSES TO PHQ QUESTIONS 1-9: 0
SUM OF ALL RESPONSES TO PHQ QUESTIONS 1-9: 0

## 2023-02-28 ASSESSMENT — ENCOUNTER SYMPTOMS
GASTROINTESTINAL NEGATIVE: 1
RESPIRATORY NEGATIVE: 1

## 2023-02-28 NOTE — PATIENT INSTRUCTIONS
Patient Instructions from Today's Visit    Reason for Visit:  Follow up     Diagnosis Information:  [email or web address] [email or web address]-us/[email or web address]-[email or web address]-[email or web address]-[email or web address]-[email or web address]-sheets      Plan: We are checking your psa today  As long as it is the same or lower we will move you appt out to 6 months. Follow Up: In 6 months with repeat labs    Recent Lab Results:  Not resulted as of this note. Treatment Summary has been discussed and given to patient:   N/a      -------------------------------------------------------------------------------------------------------------------    Patient does express an interest in My Chart. My Chart log in information explained on the after visit summary printout at the [facility] desk.     AMADO Ojeda

## 2023-03-01 LAB — PSA SERPL DL<=0.01 NG/ML-MCNC: 0.01 NG/ML (ref 0–4)

## 2023-03-08 ENCOUNTER — OFFICE VISIT (OUTPATIENT)
Dept: CARDIOLOGY CLINIC | Age: 73
End: 2023-03-08
Payer: MEDICARE

## 2023-03-08 VITALS
DIASTOLIC BLOOD PRESSURE: 88 MMHG | HEART RATE: 80 BPM | HEIGHT: 71 IN | BODY MASS INDEX: 29.12 KG/M2 | SYSTOLIC BLOOD PRESSURE: 148 MMHG | WEIGHT: 208 LBS

## 2023-03-08 DIAGNOSIS — I42.0 DILATED CARDIOMYOPATHY (HCC): ICD-10-CM

## 2023-03-08 DIAGNOSIS — E78.00 HYPERCHOLESTEREMIA: ICD-10-CM

## 2023-03-08 DIAGNOSIS — I10 PRIMARY HYPERTENSION: ICD-10-CM

## 2023-03-08 DIAGNOSIS — I25.10 ATHEROSCLEROSIS OF NATIVE CORONARY ARTERY OF NATIVE HEART WITHOUT ANGINA PECTORIS: ICD-10-CM

## 2023-03-08 DIAGNOSIS — I71.21 ANEURYSM OF ASCENDING AORTA WITHOUT RUPTURE: ICD-10-CM

## 2023-03-08 DIAGNOSIS — I47.1 ATRIAL TACHYCARDIA, PAROXYSMAL (HCC): Primary | ICD-10-CM

## 2023-03-08 PROCEDURE — 3017F COLORECTAL CA SCREEN DOC REV: CPT | Performed by: INTERNAL MEDICINE

## 2023-03-08 PROCEDURE — 1123F ACP DISCUSS/DSCN MKR DOCD: CPT | Performed by: INTERNAL MEDICINE

## 2023-03-08 PROCEDURE — 99214 OFFICE O/P EST MOD 30 MIN: CPT | Performed by: INTERNAL MEDICINE

## 2023-03-08 PROCEDURE — G8417 CALC BMI ABV UP PARAM F/U: HCPCS | Performed by: INTERNAL MEDICINE

## 2023-03-08 PROCEDURE — 3077F SYST BP >= 140 MM HG: CPT | Performed by: INTERNAL MEDICINE

## 2023-03-08 PROCEDURE — G8484 FLU IMMUNIZE NO ADMIN: HCPCS | Performed by: INTERNAL MEDICINE

## 2023-03-08 PROCEDURE — 3079F DIAST BP 80-89 MM HG: CPT | Performed by: INTERNAL MEDICINE

## 2023-03-08 PROCEDURE — 1036F TOBACCO NON-USER: CPT | Performed by: INTERNAL MEDICINE

## 2023-03-08 PROCEDURE — G8428 CUR MEDS NOT DOCUMENT: HCPCS | Performed by: INTERNAL MEDICINE

## 2023-03-08 NOTE — PROGRESS NOTES
[address], [address] [facility], [address] [place] [address]  PHONE: [phone]     23    NAME:  Nesha Waldrop  : 1950  MRN: [id number]       SUBJECTIVE:   Nesha Walrdop is a 71 y.o. male seen for a follow up visit regarding the following:     Chief Complaint   Patient presents with    Irregular Heart Beat     ECHO       HPI: Here for today for eval of CAD (mild CAD 1/15 [facility] after pos NST), HTN. CT 2017 and 2019: Ao 4.4cm. Monitor with a tach, PVCs. Echo 2023: EF 45%, mild AI, AO 4.3cm, mod MR     Better from prostate CA, then COVID. Lung lesion being followed as well. Some rare left sided Cps. Some LABOY. Patient denies recent history of orthopnea, PND, excessive dizziness and/or syncope. Prior Head and Neck CA, yrs Ago, followed at [facility] yearly. Prior XRT. Tx in [id number]. Past Medical History, Past Surgical History, Family history, Social History, and Medications were all reviewed with the patient today and updated as necessary.      Current Outpatient Medications   Medication Sig Dispense Refill    Pyridoxine HCl (VITAMIN B-6 PO) Take by mouth      Cholecalciferol (VITAMIN D3) 50 MCG ( UT) CAPS Take by mouth daily      omeprazole (PRILOSEC) 20 MG delayed release capsule Take 1 capsule by mouth every morning (before breakfast) (Patient taking differently: Take 20 mg by mouth as needed) 90 capsule 1    MAGNESIUM CARBONATE PO Take by mouth daily      POTASSIUM CITRATE PO Take by mouth daily      ZINC PO Take by mouth      ascorbic acid (VITAMIN C) 250 MG tablet Take 250 mg by mouth daily      aspirin 81 MG EC tablet Take by mouth daily      atorvastatin (LIPITOR) 10 MG tablet Take 10 mg by mouth at bedtime      loratadine (CLARITIN) 10 MG tablet Take 10 mg by mouth as needed      lisinopril (PRINIVIL;ZESTRIL) 10 MG tablet Take 1 tablet by mouth in the morning and at bedtime (Patient not taking: No sig reported) 180 tablet 3    sennosides-docusate sodium (SENOKOT-S) 8.6-50 MG tablet Take 1 tablet by mouth 2 times daily 30 tablet 0    Coenzyme Q10 (COQ10 PO) Take by mouth daily       No current facility-administered medications for this visit.         Allergies   Allergen Reactions    Rosuvastatin Myalgia     Patient Active Problem List    Diagnosis Date Noted    Dilated cardiomyopathy ([facility] 03/08/2023     Priority: Medium    Prostate cancer ([facility] 11/07/2022     Added automatically from request for surgery [id number]      Elevated prostate specific antigen (PSA) 10/13/2022     Added automatically from request for surgery [id number]      Seasonal allergic rhinitis 03/30/2021    Nocturia 12/28/2020    Asbestosis ([facility] 12/28/2020    Class 1 obesity due to excess calories with serious comorbidity and body mass index (BMI) of 30.0 to 30.9 in adult 12/23/2019    Atrial tachycardia, paroxysmal ([facility] 01/23/2018    Thoracic aortic aneurysm without rupture 02/21/2017    Bilateral carotid artery disease ([facility] 02/21/2017    Neck pain 01/03/2017    Hypertension 04/13/2016    Atherosclerosis of native coronary artery of native heart without angina pectoris 04/13/2016    Status post total left knee replacement 12/30/2015    History of throat cancer 12/04/2014    S/P total knee replacement using cement 11/25/2013    History of pneumonia     Hypercholesteremia 02/28/2013    Sleep apnea 02/28/2013    GERD (gastroesophageal reflux disease) 02/28/2013      Past Surgical History:   Procedure Laterality Date    CARDIAC CATHETERIZATION      COLONOSCOPY  1/04    Dr. Ayad Gray with normal findings    COLONOSCOPY  01/14/2019    Dr. Amanda Morales; diverticulosis and 2 fragments of tubular adenomas    KNEE ARTHROSCOPY Left 1990    PROSTATE SURGERY N/A 10/27/2022    MRI FUSION PROSTATE BIOPSY performed by Delphine Smith MD at  [address] N/A 11/15/2022    ROBOTIC ASSISTED LAPAROSCOPIC PROSTATECTOMY WITH BILATERAL LYMPH NODE DISSECTION performed by Delphine Smith MD at [facility] MAIN OR SHOULDER ARTHROSCOPY Right     TOTAL KNEE ARTHROPLASTY Left 12/30/2015    Dr. Danielle    TOTAL KNEE ARTHROPLASTY Bilateral 2013, 2014    total    TOTAL KNEE ARTHROPLASTY Bilateral 2005    Partial -      Family History   Problem Relation Age of Onset    Cancer Mother 57        colon    Heart Disease Father         pacemaker    Heart Disease Maternal Grandmother     Heart Disease Maternal Grandfather     Diabetes Mother     Stroke Paternal Grandfather     Cancer Paternal Aunt         lymphoma, breast     Social History     Tobacco Use    Smoking status: Never    Smokeless tobacco: Never   Substance Use Topics    Alcohol use: Yes     Alcohol/week: 16.0 standard drinks     Comment: occasional         ROS:    No obvious pertinent positives on review of systems except for what was outlined in the HPI today.      PHYSICAL EXAM:     BP (!) 148/88   Pulse 80   Ht 5' 11\" (1.803 m)   Wt 208 lb (94.3 kg)   BMI 29.01 kg/m²    General/Constitutional:   Alert and oriented x 3, no acute distress  HEENT:   normocephalic, atraumatic, moist mucous membranes  Neck:   No JVD or carotid bruits bilaterally  Cardiovascular:   regular rate and rhythm, no murmur/rub/gallop appreciated  Pulmonary:   clear to auscultation bilaterally, no respiratory distress  Abdomen:   soft, non-tender, non-distended  Ext:   No sig LE edema bilaterally  Skin:  warm and dry, no obvious rashes seen  Neuro:   no obvious sensory or motor deficits  Psychiatric:   normal mood and affect      Lab Results   Component Value Date/Time     11/16/2022 05:13 AM    K 4.4 11/16/2022 05:13 AM     11/16/2022 05:13 AM    CO2 26 11/16/2022 05:13 AM    BUN 18 11/16/2022 05:13 AM    CREATININE 1.00 11/16/2022 05:13 AM    GLUCOSE 111 11/16/2022 05:13 AM    CALCIUM 8.8 11/16/2022 05:13 AM        Lab Results   Component Value Date    WBC 13.8 (H) 11/16/2022    HGB 14.2 11/16/2022    HCT 43.8 11/16/2022    MCV 91.1 11/16/2022     11/16/2022       Lab  Results   Component Value Date    TSH 3.120 12/23/2019       Lab Results   Component Value Date    LABA1C 5.3 12/23/2019     Lab Results   Component Value Date     12/23/2019       Lab Results   Component Value Date    CHOL 100 03/30/2021    CHOL 154 12/28/2020    CHOL 152 12/23/2019     Lab Results   Component Value Date    TRIG 40 03/30/2021    TRIG 55 12/28/2020    TRIG 78 12/23/2019     Lab Results   Component Value Date    HDL 35 (L) 03/30/2021    HDL 37 (L) 12/28/2020    HDL 34 (L) 12/23/2019     Lab Results   Component Value Date    LDLCALC 54 03/30/2021    LDLCALC 106 (H) 12/28/2020    LDLCALC 102 (H) 12/23/2019     Lab Results   Component Value Date    LABVLDL 16 12/23/2019    VLDL 11 03/30/2021    VLDL 11 12/28/2020     No results found for: CHOLAYANA        I have Independently reviewed prior care notes, any ER records available, cardiac testing, labs and results with the patient and before seeing the patient today. Also independently reviewed outside records when available. ASSESSMENT:    Ivet Kidd was seen today for irregular heart beat. Diagnoses and all orders for this visit:    Atrial tachycardia, paroxysmal ([facility] [address]    Primary hypertension    Aneurysm of ascending aorta without rupture    Atherosclerosis of native coronary artery of native heart without angina pectoris  -     Nuclear stress test with myocardial perfusion; Future    Hypercholesteremia    Dilated cardiomyopathy ([facility]        PLAN:    1. Thor Aneurysm:  Seen by vascular, surgery would be very challenging if ever needed given prior XRT. No BB with low HR in the past, follow for now. Echo ok. No more CT scans for now. 2. HTN:  Lower, off the Ace, follow BP. White coat HTN. Needs more water intake. 3. Carotid Dz: Seen by vascular, no plan for more testing. Follow for now. Remain on ASA. 4. CM:  EF 45%, check NST. Cannot tolerate BB or Ace due to low BP and HR.       Given these risk factors and symptoms as outlined, plan for NST. We did review options of NST, LHC, other stress testing and agreed to proceed with NST in our office. To ER for worsening angina. Plan on definitive LHC for worsening angina. 5. HPL: remain on lipitor. Patient has been instructed and agrees to call our office with any issues or other concerns related to their cardiac condition(s) and/or complaint(s).         Return for Return After Test.       Keri Acosta,   3/8/2023

## 2023-04-25 RX ORDER — OMEPRAZOLE 20 MG/1
CAPSULE, DELAYED RELEASE ORAL
Qty: 90 CAPSULE | Refills: 1 | OUTPATIENT
Start: 2023-04-25

## 2023-05-09 ENCOUNTER — OFFICE VISIT (OUTPATIENT)
Age: 73
End: 2023-05-09
Payer: MEDICARE

## 2023-05-09 VITALS
SYSTOLIC BLOOD PRESSURE: 132 MMHG | DIASTOLIC BLOOD PRESSURE: 94 MMHG | WEIGHT: 205.8 LBS | HEART RATE: 74 BPM | HEIGHT: 71 IN | BODY MASS INDEX: 28.81 KG/M2

## 2023-05-09 DIAGNOSIS — I10 PRIMARY HYPERTENSION: ICD-10-CM

## 2023-05-09 DIAGNOSIS — I65.23 BILATERAL CAROTID ARTERY STENOSIS: ICD-10-CM

## 2023-05-09 DIAGNOSIS — I42.0 DILATED CARDIOMYOPATHY (HCC): ICD-10-CM

## 2023-05-09 DIAGNOSIS — I47.1 ATRIAL TACHYCARDIA, PAROXYSMAL (HCC): Primary | ICD-10-CM

## 2023-05-09 DIAGNOSIS — I71.21 ANEURYSM OF ASCENDING AORTA WITHOUT RUPTURE (HCC): ICD-10-CM

## 2023-05-09 PROCEDURE — G8428 CUR MEDS NOT DOCUMENT: HCPCS | Performed by: INTERNAL MEDICINE

## 2023-05-09 PROCEDURE — 3075F SYST BP GE 130 - 139MM HG: CPT | Performed by: INTERNAL MEDICINE

## 2023-05-09 PROCEDURE — 3017F COLORECTAL CA SCREEN DOC REV: CPT | Performed by: INTERNAL MEDICINE

## 2023-05-09 PROCEDURE — 1123F ACP DISCUSS/DSCN MKR DOCD: CPT | Performed by: INTERNAL MEDICINE

## 2023-05-09 PROCEDURE — G8417 CALC BMI ABV UP PARAM F/U: HCPCS | Performed by: INTERNAL MEDICINE

## 2023-05-09 PROCEDURE — 3080F DIAST BP >= 90 MM HG: CPT | Performed by: INTERNAL MEDICINE

## 2023-05-09 PROCEDURE — 1036F TOBACCO NON-USER: CPT | Performed by: INTERNAL MEDICINE

## 2023-05-09 PROCEDURE — 99214 OFFICE O/P EST MOD 30 MIN: CPT | Performed by: INTERNAL MEDICINE

## 2023-05-09 NOTE — PROGRESS NOTES
[address], [address] [facility], [address] [place] [address]  PHONE: [phone]     23    NAME:  Armando Mayo  : 1950  MRN: [id number]       SUBJECTIVE:   Armando Mayo is a 71 y.o. male seen for a follow up visit regarding the following:     Chief Complaint   Patient presents with    Coronary Artery Disease     NST       HPI:  Here for today for eval of CAD (mild CAD 1/15 [facility]), HTN. CT 2017 and 2019: Ao 4.4cm. Monitor with a tach, PVCs. Echo 2023: EF 45%, mild AI, AO 4.3cm, mod MR    NST 2023:  Low risk, Frequent PVCs including several ventricular couplets. Better from prostate CA, feeling better now. No angina, No CP, pressure. Some LABOY. Patient denies recent history of orthopnea, PND, excessive dizziness and/or syncope. Prior Head and Neck CA, yrs Ago, followed at [facility] yearly. Prior XRT. Tx in [id number]. Past Medical History, Past Surgical History, Family history, Social History, and Medications were all reviewed with the patient today and updated as necessary.      Current Outpatient Medications   Medication Sig Dispense Refill    Pyridoxine HCl (VITAMIN B-6 PO) Take by mouth      Coenzyme Q10 (COQ10 PO) Take by mouth daily      Cholecalciferol (VITAMIN D3) 50 MCG (2000) CAPS Take by mouth daily      omeprazole (PRILOSEC) 20 MG delayed release capsule Take 1 capsule by mouth every morning (before breakfast) (Patient taking differently: Take 1 capsule by mouth as needed) 90 capsule 1    MAGNESIUM CARBONATE PO Take by mouth daily      POTASSIUM CITRATE PO Take by mouth daily      ZINC PO Take by mouth      ascorbic acid (VITAMIN C) 250 MG tablet Take 1 tablet by mouth daily      aspirin 81 MG EC tablet Take by mouth daily      atorvastatin (LIPITOR) 10 MG tablet Take 1 tablet by mouth at bedtime      loratadine (CLARITIN) 10 MG tablet Take 1 tablet by mouth as needed      lisinopril (PRINIVIL;ZESTRIL) 10 MG tablet Take 1 tablet by mouth in the

## 2023-08-24 DIAGNOSIS — C61 PROSTATE CANCER (HCC): Primary | ICD-10-CM

## 2023-08-24 NOTE — PROGRESS NOTES
[facility]  [address] [place] [address]  [phone]        Mr. Ferdie Fleischer is a 68 y.o. male with a diagnosis of prostate cancer. S/p RALP on 11/15/22, [place] score 3+4=7 prostate cancer, pT3bN0 (0/13). INTERVAL HISTORY: Patient is here today for follow-up of his prostate cancer. He underwent RALP on 11/15/2022. He has done quite well. His PSA was checked on 7/27/2023 at [facility] and was found to be less than 0.008. He has no stress urinary incontinence. He states his energy and activity level is normal.      From previous note:  Patient is here for follow-up. He underwent a robotic prostatectomy on 11/15/2022 for a [place] 7, pT3bN0. His first ultrasensitive PSA on 1/9/2023 was 0.014. He continues to do well from a urinary function standpoint. He is not using any pads. He voids with a strong stream.        From previous note(s):   Patient is here today for follow-up after his robotic radical prostatectomy on 11/15/2022. He has no complaints. He is currently wearing no pads and states he has no stress urinary incontinence. He only wore pads for approximately 2 weeks. He is voiding with a strong stream.  He denies any hematuria or dysuria. His ultrasensitive PSA is pending. From previous note:   Patient is here today for follow-up after his robotic prostatectomy on 11/15/2022. He has no complaints. His energy level is returned to normal.  His bowels are functioning normally. He denies any pain. He has not had any hematuria. He denies any lower extremity edema or tenderness. From previous note(s):  Patient is here today for follow-up after his MRI fusion prostate biopsy on 10/27/2022. He was found to have Yadi 4+3 in a total of 5 cores including region of interest #3. He also had [place] 3+3 equal 6 in 2 additional cores. His initial PSA was 5.9.   His digital rectal exam showed bilateral induration, right

## 2023-08-28 ENCOUNTER — HOSPITAL ENCOUNTER (OUTPATIENT)
Dept: LAB | Age: 73
Discharge: HOME | End: 2023-08-31
Payer: MEDICARE

## 2023-08-28 ENCOUNTER — OFFICE VISIT (OUTPATIENT)
Dept: ONCOLOGY | Age: 73
End: 2023-08-28
Payer: MEDICARE

## 2023-08-28 VITALS
BODY MASS INDEX: 29.16 KG/M2 | SYSTOLIC BLOOD PRESSURE: 164 MMHG | HEART RATE: 73 BPM | DIASTOLIC BLOOD PRESSURE: 85 MMHG | HEIGHT: 71 IN | OXYGEN SATURATION: 100 % | RESPIRATION RATE: 16 BRPM | WEIGHT: 208.3 LBS | TEMPERATURE: 98 F

## 2023-08-28 DIAGNOSIS — C61 PROSTATE CANCER (HCC): Primary | ICD-10-CM

## 2023-08-28 DIAGNOSIS — C61 PROSTATE CANCER (HCC): ICD-10-CM

## 2023-08-28 PROCEDURE — 99213 OFFICE O/P EST LOW 20 MIN: CPT | Performed by: UROLOGY

## 2023-08-28 PROCEDURE — 84153 ASSAY OF PSA TOTAL: CPT

## 2023-08-28 PROCEDURE — 36415 COLL VENOUS BLD VENIPUNCTURE: CPT

## 2023-08-28 PROCEDURE — G8427 DOCREV CUR MEDS BY ELIG CLIN: HCPCS | Performed by: UROLOGY

## 2023-08-28 PROCEDURE — 1123F ACP DISCUSS/DSCN MKR DOCD: CPT | Performed by: UROLOGY

## 2023-08-28 PROCEDURE — 3079F DIAST BP 80-89 MM HG: CPT | Performed by: UROLOGY

## 2023-08-28 PROCEDURE — G8417 CALC BMI ABV UP PARAM F/U: HCPCS | Performed by: UROLOGY

## 2023-08-28 PROCEDURE — 1036F TOBACCO NON-USER: CPT | Performed by: UROLOGY

## 2023-08-28 PROCEDURE — 3017F COLORECTAL CA SCREEN DOC REV: CPT | Performed by: UROLOGY

## 2023-08-28 PROCEDURE — 3077F SYST BP >= 140 MM HG: CPT | Performed by: UROLOGY

## 2023-08-28 ASSESSMENT — PATIENT HEALTH QUESTIONNAIRE - PHQ9
SUM OF ALL RESPONSES TO PHQ QUESTIONS 1-9: 0
SUM OF ALL RESPONSES TO PHQ QUESTIONS 1-9: 0
1. LITTLE INTEREST OR PLEASURE IN DOING THINGS: 0
SUM OF ALL RESPONSES TO PHQ QUESTIONS 1-9: 0
SUM OF ALL RESPONSES TO PHQ9 QUESTIONS 1 & 2: 0
2. FEELING DOWN, DEPRESSED OR HOPELESS: 0
SUM OF ALL RESPONSES TO PHQ QUESTIONS 1-9: 0

## 2023-08-28 ASSESSMENT — ENCOUNTER SYMPTOMS
GASTROINTESTINAL NEGATIVE: 1
RESPIRATORY NEGATIVE: 1

## 2023-08-29 LAB — PSA SERPL DL<=0.01 NG/ML-MCNC: 0.01 NG/ML (ref 0–4)

## 2023-10-26 ENCOUNTER — TELEPHONE (OUTPATIENT)
Age: 73
End: 2023-10-26

## 2023-10-26 NOTE — TELEPHONE ENCOUNTER
Pt.reports that while working in yard everything got blurry,he was dizzy and BP a little bit later was 90/40 something. Then last night driving to ball game got dizzy BP was 105/40 and this am BP is 104/60 HR 80's. He feels he is hydrating w/about 4 bottles of water a day or so. He is not on any BP meds at this time. Only thing he takes is Vit. C,mag supplement,Zinc,Claritin,COQ10,Lipitor  and ASA 81mg qday. Before the last two days BP had been high. Any advice?

## 2023-10-26 NOTE — TELEPHONE ENCOUNTER
Patient called and stated he is having low BP, He also stated he has been dealing with dizziness but not dizzy at the moment.

## 2023-10-26 NOTE — TELEPHONE ENCOUNTER
Push fluid and snacking if not on any BP meds. Is he feeling sick? ? Needs to see PCP ASAP, get labs. Get some rest.    Could he have COVID? Other?   Thanks

## 2023-11-01 ENCOUNTER — TELEPHONE (OUTPATIENT)
Age: 73
End: 2023-11-01

## 2023-11-01 RX ORDER — AMLODIPINE BESYLATE 5 MG/1
5 TABLET ORAL DAILY
Qty: 30 TABLET | Refills: 11 | Status: SHIPPED | OUTPATIENT
Start: 2023-11-01

## 2023-11-01 NOTE — TELEPHONE ENCOUNTER
BP today at 2:18pm was  186/103. He had called in last week w/low BP 90/40. Two hours ago BP =202/96. Sunday and Monday 124/78. BP just going all over the place. He is not on any BP meds. He has a slight headache otherwise he feels fine. Please advise. Parminder Thomas

## 2023-11-01 NOTE — TELEPHONE ENCOUNTER
Pain?  Anxiety? Other causes? ?  Add norvasc 5 daily, work on hydration, consistent diet. Avoid dehydration. Needs to drink water. Nurse office check next week with home machine. See me in 3-4 weeks. Call for issues.    Thanks

## 2023-11-01 NOTE — TELEPHONE ENCOUNTER
Pt.does not know of any cause of elevated BP. I told him 's response and made appt. next week for BP check. Pt.has a fu 11/21. Med escribed as below:  Requested Prescriptions     Signed Prescriptions Disp Refills    amLODIPine (NORVASC) 5 MG tablet 30 tablet 11     Sig: Take 1 tablet by mouth daily     Authorizing Provider: Tash Pace     Ordering User: Macey Hendrickson

## 2023-11-01 NOTE — TELEPHONE ENCOUNTER
Patient states his BP is 106/103. Patient has a small headache. He states earlier today it was 202/96 while he was in the garage moving some things around.

## 2023-11-08 ENCOUNTER — NURSE ONLY (OUTPATIENT)
Age: 73
End: 2023-11-08

## 2023-11-08 VITALS — DIASTOLIC BLOOD PRESSURE: 84 MMHG | HEART RATE: 80 BPM | SYSTOLIC BLOOD PRESSURE: 150 MMHG

## 2023-11-08 NOTE — PROGRESS NOTES
Pt in for BP check. Dr. Sam Hernandez aware of results. Home BP's running 140-150/70-80. Denies any more dizziness. Per Dr. [address], pt instructed to keep taking Norvasc, hydrating, checking home BP's and keep f/u appt on 11-21. Pt also instructed to call back with any new issues. Pt v/u.

## 2023-11-15 ENCOUNTER — CLINICAL DOCUMENTATION (OUTPATIENT)
Dept: VASCULAR SURGERY | Age: 73
End: 2023-11-15

## 2023-11-15 NOTE — PROGRESS NOTES
Called pt. To schedule his 1 year Carotid US and evaluation. He stated he is seeing [facility] for other throat condition, and he stated they\"check all of that\". And he stated if they don't he would call us back after his appt. in Jan/2024. I voiced understanding.

## 2023-11-21 ENCOUNTER — OFFICE VISIT (OUTPATIENT)
Age: 73
End: 2023-11-21
Payer: MEDICARE

## 2023-11-21 VITALS
BODY MASS INDEX: 28.84 KG/M2 | WEIGHT: 206 LBS | SYSTOLIC BLOOD PRESSURE: 115 MMHG | HEIGHT: 71 IN | HEART RATE: 72 BPM | DIASTOLIC BLOOD PRESSURE: 72 MMHG

## 2023-11-21 DIAGNOSIS — I47.19 ATRIAL TACHYCARDIA, PAROXYSMAL: ICD-10-CM

## 2023-11-21 DIAGNOSIS — I71.21 ANEURYSM OF ASCENDING AORTA WITHOUT RUPTURE (HCC): ICD-10-CM

## 2023-11-21 DIAGNOSIS — I42.0 DILATED CARDIOMYOPATHY (HCC): ICD-10-CM

## 2023-11-21 DIAGNOSIS — I65.23 BILATERAL CAROTID ARTERY STENOSIS: Primary | ICD-10-CM

## 2023-11-21 DIAGNOSIS — I25.10 ATHEROSCLEROSIS OF NATIVE CORONARY ARTERY OF NATIVE HEART WITHOUT ANGINA PECTORIS: ICD-10-CM

## 2023-11-21 DIAGNOSIS — I10 PRIMARY HYPERTENSION: ICD-10-CM

## 2023-11-21 PROCEDURE — 1123F ACP DISCUSS/DSCN MKR DOCD: CPT | Performed by: INTERNAL MEDICINE

## 2023-11-21 PROCEDURE — 3078F DIAST BP <80 MM HG: CPT | Performed by: INTERNAL MEDICINE

## 2023-11-21 PROCEDURE — 1036F TOBACCO NON-USER: CPT | Performed by: INTERNAL MEDICINE

## 2023-11-21 PROCEDURE — G8427 DOCREV CUR MEDS BY ELIG CLIN: HCPCS | Performed by: INTERNAL MEDICINE

## 2023-11-21 PROCEDURE — 3074F SYST BP LT 130 MM HG: CPT | Performed by: INTERNAL MEDICINE

## 2023-11-21 PROCEDURE — G8417 CALC BMI ABV UP PARAM F/U: HCPCS | Performed by: INTERNAL MEDICINE

## 2023-11-21 PROCEDURE — 99214 OFFICE O/P EST MOD 30 MIN: CPT | Performed by: INTERNAL MEDICINE

## 2023-11-21 PROCEDURE — G8484 FLU IMMUNIZE NO ADMIN: HCPCS | Performed by: INTERNAL MEDICINE

## 2023-11-21 PROCEDURE — 3017F COLORECTAL CA SCREEN DOC REV: CPT | Performed by: INTERNAL MEDICINE

## 2023-11-21 RX ORDER — AMLODIPINE BESYLATE 5 MG/1
5 TABLET ORAL DAILY
Qty: 90 TABLET | Refills: 3 | Status: SHIPPED | OUTPATIENT
Start: 2023-11-21

## 2023-11-21 RX ORDER — OMEPRAZOLE 20 MG/1
20 CAPSULE, DELAYED RELEASE ORAL PRN
Qty: 90 CAPSULE | Refills: 3 | Status: SHIPPED | OUTPATIENT
Start: 2023-11-21

## 2023-11-21 NOTE — PROGRESS NOTES
PO) Take by mouth       No current facility-administered medications for this visit.         Allergies   Allergen Reactions    Rosuvastatin Myalgia     Patient Active Problem List    Diagnosis Date Noted    Dilated cardiomyopathy ([address]) 03/08/2023     Priority: Medium    Prostate cancer ([address]) 11/07/2022     Added automatically from request for surgery [id number]      Elevated prostate specific antigen (PSA) 10/13/2022     Added automatically from request for surgery [id number]      Seasonal allergic rhinitis 03/30/2021    Nocturia 12/28/2020    Asbestosis ([address]) 12/28/2020    Class 1 obesity due to excess calories with serious comorbidity and body mass index (BMI) of 30.0 to 30.9 in adult 12/23/2019    Atrial tachycardia, paroxysmal 01/23/2018    Thoracic aortic aneurysm without rupture ([address]) 02/21/2017    Bilateral carotid artery disease ([address]) 02/21/2017    Neck pain 01/03/2017    Hypertension 04/13/2016    Atherosclerosis of native coronary artery of native heart without angina pectoris 04/13/2016    Status post total left knee replacement 12/30/2015    History of throat cancer 12/04/2014    S/P total knee replacement using cement 11/25/2013    History of pneumonia     Hypercholesteremia 02/28/2013    Sleep apnea 02/28/2013    GERD (gastroesophageal reflux disease) 02/28/2013      Past Surgical History:   Procedure Laterality Date    CARDIAC CATHETERIZATION      COLONOSCOPY  1/04    Dr. Marli Sterling with normal findings    COLONOSCOPY  01/14/2019    Dr. Suzanne Miles; diverticulosis and 2 fragments of tubular adenomas    KNEE ARTHROSCOPY Left 1990    PROSTATE SURGERY N/A 10/27/2022    MRI FUSION PROSTATE BIOPSY performed by Pat Wong MD at [facility] N/A 11/15/2022    ROBOTIC ASSISTED LAPAROSCOPIC PROSTATECTOMY WITH BILATERAL LYMPH NODE DISSECTION performed by Pat Wong MD at [facility] MAIN OR    SHOULDER ARTHROSCOPY Right     TOTAL KNEE ARTHROPLASTY Left 12/30/2015    Dr. Yanira Power

## 2024-03-05 DIAGNOSIS — C61 PROSTATE CANCER (HCC): Primary | ICD-10-CM

## 2024-03-11 ENCOUNTER — HOSPITAL ENCOUNTER (OUTPATIENT)
Dept: LAB | Age: 74
Discharge: HOME | End: 2024-03-14
Payer: MEDICARE

## 2024-03-11 ENCOUNTER — OFFICE VISIT (OUTPATIENT)
Dept: ONCOLOGY | Age: 74
End: 2024-03-11
Payer: MEDICARE

## 2024-03-11 VITALS
TEMPERATURE: 97.7 F | HEIGHT: 71 IN | OXYGEN SATURATION: 98 % | HEART RATE: 80 BPM | DIASTOLIC BLOOD PRESSURE: 84 MMHG | WEIGHT: 202.1 LBS | BODY MASS INDEX: 28.29 KG/M2 | SYSTOLIC BLOOD PRESSURE: 149 MMHG | RESPIRATION RATE: 16 BRPM

## 2024-03-11 DIAGNOSIS — C61 PROSTATE CANCER (HCC): ICD-10-CM

## 2024-03-11 DIAGNOSIS — C61 PROSTATE CANCER (HCC): Primary | ICD-10-CM

## 2024-03-11 PROCEDURE — 3079F DIAST BP 80-89 MM HG: CPT | Performed by: UROLOGY

## 2024-03-11 PROCEDURE — 1123F ACP DISCUSS/DSCN MKR DOCD: CPT | Performed by: UROLOGY

## 2024-03-11 PROCEDURE — G8427 DOCREV CUR MEDS BY ELIG CLIN: HCPCS | Performed by: UROLOGY

## 2024-03-11 PROCEDURE — 1036F TOBACCO NON-USER: CPT | Performed by: UROLOGY

## 2024-03-11 PROCEDURE — G8417 CALC BMI ABV UP PARAM F/U: HCPCS | Performed by: UROLOGY

## 2024-03-11 PROCEDURE — G8484 FLU IMMUNIZE NO ADMIN: HCPCS | Performed by: UROLOGY

## 2024-03-11 PROCEDURE — 3077F SYST BP >= 140 MM HG: CPT | Performed by: UROLOGY

## 2024-03-11 PROCEDURE — 36415 COLL VENOUS BLD VENIPUNCTURE: CPT

## 2024-03-11 PROCEDURE — 99213 OFFICE O/P EST LOW 20 MIN: CPT | Performed by: UROLOGY

## 2024-03-11 PROCEDURE — 3017F COLORECTAL CA SCREEN DOC REV: CPT | Performed by: UROLOGY

## 2024-03-11 PROCEDURE — 84153 ASSAY OF PSA TOTAL: CPT

## 2024-03-11 NOTE — PROGRESS NOTES
Urologic Oncology  [facility] Hematology & Oncology  [address] [place] [id number]  [phone]        Mr. Zander Whelan is a 73 y.o. male with a diagnosis of prostate cancer. S/p RALP on 11/15/22, Yadi score 3+4=7 prostate cancer, pT3bN0 (0/13).     INTERVAL HISTORY:    Mr. Whelan returns today for follow-up of his 3+4=7 NCCN favorable intermediate risk prostate cancer status post RALP on 11/15/2022.    He is without any significant lower urinary tract symptoms.  He denies any urinary incontinence.  He has been very active with his grandchildren and their extracurricular activities.  He is looking forward to spring break trip to [facility] with his children and grandchildren next week and an 8-day cruise to the [facility] with his wife for their 50th anniversary in June.    From previous note (8/28/23):  Patient is here today for follow-up of his prostate cancer. He underwent RALP on 11/15/2022. He has done quite well. His PSA was checked on 7/27/2023 at [facility] and was found to be less than 0.008. He has no stress urinary incontinence. He states his energy and activity level is normal.     Past medical, family and social histories, as well as medications and allergies, were reviewed and updated in the medical record as appropriate.    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

## 2024-03-12 LAB — PSA SERPL DL<=0.01 NG/ML-MCNC: 0.01 NG/ML (ref 0–4)

## 2024-05-23 ENCOUNTER — OFFICE VISIT (OUTPATIENT)
Age: 74
End: 2024-05-23
Payer: MEDICARE

## 2024-05-23 VITALS
WEIGHT: 204 LBS | SYSTOLIC BLOOD PRESSURE: 138 MMHG | HEIGHT: 71 IN | DIASTOLIC BLOOD PRESSURE: 80 MMHG | HEART RATE: 76 BPM | BODY MASS INDEX: 28.56 KG/M2

## 2024-05-23 DIAGNOSIS — I47.19 ATRIAL TACHYCARDIA, PAROXYSMAL (HCC): ICD-10-CM

## 2024-05-23 DIAGNOSIS — I25.10 ATHEROSCLEROSIS OF NATIVE CORONARY ARTERY OF NATIVE HEART WITHOUT ANGINA PECTORIS: Primary | ICD-10-CM

## 2024-05-23 DIAGNOSIS — I71.21 ANEURYSM OF ASCENDING AORTA WITHOUT RUPTURE (HCC): ICD-10-CM

## 2024-05-23 DIAGNOSIS — I10 PRIMARY HYPERTENSION: ICD-10-CM

## 2024-05-23 DIAGNOSIS — I42.0 DILATED CARDIOMYOPATHY (HCC): ICD-10-CM

## 2024-05-23 PROCEDURE — 1123F ACP DISCUSS/DSCN MKR DOCD: CPT | Performed by: INTERNAL MEDICINE

## 2024-05-23 PROCEDURE — 99214 OFFICE O/P EST MOD 30 MIN: CPT | Performed by: INTERNAL MEDICINE

## 2024-05-23 PROCEDURE — 3017F COLORECTAL CA SCREEN DOC REV: CPT | Performed by: INTERNAL MEDICINE

## 2024-05-23 PROCEDURE — G8428 CUR MEDS NOT DOCUMENT: HCPCS | Performed by: INTERNAL MEDICINE

## 2024-05-23 PROCEDURE — 1036F TOBACCO NON-USER: CPT | Performed by: INTERNAL MEDICINE

## 2024-05-23 PROCEDURE — 93000 ELECTROCARDIOGRAM COMPLETE: CPT | Performed by: INTERNAL MEDICINE

## 2024-05-23 PROCEDURE — G8417 CALC BMI ABV UP PARAM F/U: HCPCS | Performed by: INTERNAL MEDICINE

## 2024-05-23 PROCEDURE — 3075F SYST BP GE 130 - 139MM HG: CPT | Performed by: INTERNAL MEDICINE

## 2024-05-23 PROCEDURE — 3079F DIAST BP 80-89 MM HG: CPT | Performed by: INTERNAL MEDICINE

## 2024-05-23 NOTE — PROGRESS NOTES
disease.    Diagnoses and all orders for this visit:    Atherosclerosis of native coronary artery of native heart without angina pectoris  -     EKG 12 Lead    Atrial tachycardia, paroxysmal (HCC)  -     EKG 12 Lead    Dilated cardiomyopathy (HCC)    Primary hypertension    Aneurysm of ascending aorta without rupture (HCC)          PLAN:     1. Thor Aneurysm:  Seen by vascular, surgery would be very challenging if ever needed given prior XRT.     No BB with low HR in the past, follow for now.   Echo ok.    No more CT scans for now.      Follow BP, on norvasc.          2. HTN:  Ace caused labile Bps he feels, better on norvasc now.       3. Carotid Dz:  Seen by vascular, no plan for more testing.  Follow for now. Remain on ASA.       4. CM:  EF 45%.  Cannot tolerate BB or Ace due to low BP and HR.    PVCs noted, NST ok, hold on cath.          5. HPL: remain on lipitor.        Patient has been instructed and agrees to call our office with any issues or other concerns related to their cardiac condition(s) and/or complaint(s).        Return in about 6 months (around 11/23/2024).       Prince Fairchild, DO  5/23/2024

## 2024-09-20 ENCOUNTER — HOSPITAL ENCOUNTER (OUTPATIENT)
Dept: LAB | Age: 74
Discharge: HOME | End: 2024-09-23
Payer: MEDICARE

## 2024-09-20 ENCOUNTER — OFFICE VISIT (OUTPATIENT)
Dept: ONCOLOGY | Age: 74
End: 2024-09-20
Payer: MEDICARE

## 2024-09-20 VITALS
TEMPERATURE: 97.8 F | RESPIRATION RATE: 16 BRPM | HEIGHT: 71 IN | WEIGHT: 199.5 LBS | OXYGEN SATURATION: 97 % | BODY MASS INDEX: 27.93 KG/M2 | DIASTOLIC BLOOD PRESSURE: 76 MMHG | SYSTOLIC BLOOD PRESSURE: 128 MMHG | HEART RATE: 72 BPM

## 2024-09-20 DIAGNOSIS — C61 PROSTATE CANCER (HCC): Primary | ICD-10-CM

## 2024-09-20 DIAGNOSIS — C61 PROSTATE CANCER (HCC): ICD-10-CM

## 2024-09-20 PROCEDURE — 3074F SYST BP LT 130 MM HG: CPT | Performed by: UROLOGY

## 2024-09-20 PROCEDURE — 99213 OFFICE O/P EST LOW 20 MIN: CPT | Performed by: UROLOGY

## 2024-09-20 PROCEDURE — 1123F ACP DISCUSS/DSCN MKR DOCD: CPT | Performed by: UROLOGY

## 2024-09-20 PROCEDURE — G8417 CALC BMI ABV UP PARAM F/U: HCPCS | Performed by: UROLOGY

## 2024-09-20 PROCEDURE — 84153 ASSAY OF PSA TOTAL: CPT

## 2024-09-20 PROCEDURE — 1036F TOBACCO NON-USER: CPT | Performed by: UROLOGY

## 2024-09-20 PROCEDURE — 36415 COLL VENOUS BLD VENIPUNCTURE: CPT

## 2024-09-20 PROCEDURE — 3078F DIAST BP <80 MM HG: CPT | Performed by: UROLOGY

## 2024-09-20 PROCEDURE — 3017F COLORECTAL CA SCREEN DOC REV: CPT | Performed by: UROLOGY

## 2024-09-20 PROCEDURE — G8427 DOCREV CUR MEDS BY ELIG CLIN: HCPCS | Performed by: UROLOGY

## 2024-09-20 ASSESSMENT — PATIENT HEALTH QUESTIONNAIRE - PHQ9
SUM OF ALL RESPONSES TO PHQ QUESTIONS 1-9: 0
SUM OF ALL RESPONSES TO PHQ QUESTIONS 1-9: 0
1. LITTLE INTEREST OR PLEASURE IN DOING THINGS: NOT AT ALL
SUM OF ALL RESPONSES TO PHQ QUESTIONS 1-9: 0
2. FEELING DOWN, DEPRESSED OR HOPELESS: NOT AT ALL
SUM OF ALL RESPONSES TO PHQ QUESTIONS 1-9: 0
SUM OF ALL RESPONSES TO PHQ9 QUESTIONS 1 & 2: 0

## 2024-09-20 ASSESSMENT — ENCOUNTER SYMPTOMS
GASTROINTESTINAL NEGATIVE: 1
RESPIRATORY NEGATIVE: 1

## 2024-09-21 LAB — PSA SERPL DL<=0.01 NG/ML-MCNC: 0.01 NG/ML (ref 0–4)

## 2024-11-20 ENCOUNTER — OFFICE VISIT (OUTPATIENT)
Age: 74
End: 2024-11-20
Payer: MEDICARE

## 2024-11-20 VITALS
SYSTOLIC BLOOD PRESSURE: 124 MMHG | HEART RATE: 76 BPM | WEIGHT: 203.6 LBS | DIASTOLIC BLOOD PRESSURE: 70 MMHG | HEIGHT: 71 IN | BODY MASS INDEX: 28.5 KG/M2

## 2024-11-20 DIAGNOSIS — I42.0 DILATED CARDIOMYOPATHY (HCC): ICD-10-CM

## 2024-11-20 DIAGNOSIS — I47.19 ATRIAL TACHYCARDIA, PAROXYSMAL (HCC): ICD-10-CM

## 2024-11-20 DIAGNOSIS — I10 PRIMARY HYPERTENSION: ICD-10-CM

## 2024-11-20 DIAGNOSIS — I25.10 ATHEROSCLEROSIS OF NATIVE CORONARY ARTERY OF NATIVE HEART WITHOUT ANGINA PECTORIS: ICD-10-CM

## 2024-11-20 DIAGNOSIS — I65.23 BILATERAL CAROTID ARTERY STENOSIS: ICD-10-CM

## 2024-11-20 DIAGNOSIS — I71.21 ANEURYSM OF ASCENDING AORTA WITHOUT RUPTURE (HCC): Primary | ICD-10-CM

## 2024-11-20 PROCEDURE — G8484 FLU IMMUNIZE NO ADMIN: HCPCS | Performed by: INTERNAL MEDICINE

## 2024-11-20 PROCEDURE — 1159F MED LIST DOCD IN RCRD: CPT | Performed by: INTERNAL MEDICINE

## 2024-11-20 PROCEDURE — G8427 DOCREV CUR MEDS BY ELIG CLIN: HCPCS | Performed by: INTERNAL MEDICINE

## 2024-11-20 PROCEDURE — 99214 OFFICE O/P EST MOD 30 MIN: CPT | Performed by: INTERNAL MEDICINE

## 2024-11-20 PROCEDURE — 1036F TOBACCO NON-USER: CPT | Performed by: INTERNAL MEDICINE

## 2024-11-20 PROCEDURE — 3017F COLORECTAL CA SCREEN DOC REV: CPT | Performed by: INTERNAL MEDICINE

## 2024-11-20 PROCEDURE — 1126F AMNT PAIN NOTED NONE PRSNT: CPT | Performed by: INTERNAL MEDICINE

## 2024-11-20 PROCEDURE — 1123F ACP DISCUSS/DSCN MKR DOCD: CPT | Performed by: INTERNAL MEDICINE

## 2024-11-20 PROCEDURE — 3078F DIAST BP <80 MM HG: CPT | Performed by: INTERNAL MEDICINE

## 2024-11-20 PROCEDURE — G8417 CALC BMI ABV UP PARAM F/U: HCPCS | Performed by: INTERNAL MEDICINE

## 2024-11-20 PROCEDURE — 3074F SYST BP LT 130 MM HG: CPT | Performed by: INTERNAL MEDICINE

## 2024-11-20 RX ORDER — AMLODIPINE BESYLATE 5 MG/1
5 TABLET ORAL DAILY
Qty: 90 TABLET | Refills: 3 | Status: SHIPPED | OUTPATIENT
Start: 2024-11-20

## 2024-11-20 NOTE — PROGRESS NOTES
[address] [facility], [place] [address]  PHONE: [phone]     24    NAME:  Zander Whelan  : 1950  MRN: [id number]       SUBJECTIVE:   Zander Whelan is a 74 y.o. male seen for a follow up visit regarding the following:     Chief Complaint   Patient presents with    Coronary Artery Disease       HPI: Here for today for eval of CAD (mild CAD 1/15 [facility]), HTN.         CT 2017 and 2019: Ao 4.4cm.    Monitor with a tach, PVCs.    Echo 2023: EF 45%, mild AI, AO 4.3cm, mod MR     NST 2023:  Low risk, Frequent PVCs including several ventricular couplets.     NO new palp.  Walking, active around home, yard and beach, feeling well now.  NO angina.   No CP, pressure.  Some LABOY.   Patient denies recent history of orthopnea, PND, excessive dizziness and/or syncope.      Prior Head and Neck CA, yrs Ago, followed at [facility] yearly.  Prior XRT.  Tx in [id number].     Past Medical History, Past Surgical History, Family history, Social History, and Medications were all reviewed with the patient today and updated as necessary.     Current Outpatient Medications   Medication Sig Dispense Refill    amLODIPine (NORVASC) 5 MG tablet Take 1 tablet by mouth daily 90 tablet 3    omeprazole (PRILOSEC) 20 MG delayed release capsule Take 1 capsule by mouth as needed (prn) 90 capsule 3    Pyridoxine HCl (VITAMIN B-6 PO) Take by mouth      Coenzyme Q10 (COQ10 PO) Take by mouth daily      Cholecalciferol (VITAMIN D3) 50 MCG (2000) CAPS Take by mouth daily      MAGNESIUM CARBONATE PO Take by mouth daily      POTASSIUM CITRATE PO Take by mouth daily      ZINC PO Take by mouth      ascorbic acid (VITAMIN C) 250 MG tablet Take 1 tablet by mouth daily      aspirin 81 MG EC tablet Take by mouth daily      atorvastatin (LIPITOR) 10 MG tablet Take 1 tablet by mouth at bedtime      loratadine (CLARITIN) 10 MG tablet Take 1 tablet by mouth as needed      Cyanocobalamin (VITAMIN B 12 PO) Take by mouth (Patient not

## 2025-05-19 ENCOUNTER — OFFICE VISIT (OUTPATIENT)
Age: 75
End: 2025-05-19
Payer: MEDICARE

## 2025-05-19 VITALS
BODY MASS INDEX: 27.58 KG/M2 | SYSTOLIC BLOOD PRESSURE: 98 MMHG | DIASTOLIC BLOOD PRESSURE: 68 MMHG | HEIGHT: 71 IN | HEART RATE: 80 BPM | WEIGHT: 197 LBS

## 2025-05-19 DIAGNOSIS — I49.3 VENTRICULAR ECTOPIC BEATS: ICD-10-CM

## 2025-05-19 DIAGNOSIS — I42.0 DILATED CARDIOMYOPATHY (HCC): ICD-10-CM

## 2025-05-19 DIAGNOSIS — R06.02 SHORTNESS OF BREATH: ICD-10-CM

## 2025-05-19 DIAGNOSIS — I10 PRIMARY HYPERTENSION: ICD-10-CM

## 2025-05-19 DIAGNOSIS — I47.19 ATRIAL TACHYCARDIA, PAROXYSMAL: Primary | ICD-10-CM

## 2025-05-19 PROCEDURE — 93000 ELECTROCARDIOGRAM COMPLETE: CPT | Performed by: INTERNAL MEDICINE

## 2025-05-19 PROCEDURE — 3074F SYST BP LT 130 MM HG: CPT | Performed by: INTERNAL MEDICINE

## 2025-05-19 PROCEDURE — 1036F TOBACCO NON-USER: CPT | Performed by: INTERNAL MEDICINE

## 2025-05-19 PROCEDURE — 3078F DIAST BP <80 MM HG: CPT | Performed by: INTERNAL MEDICINE

## 2025-05-19 PROCEDURE — G8427 DOCREV CUR MEDS BY ELIG CLIN: HCPCS | Performed by: INTERNAL MEDICINE

## 2025-05-19 PROCEDURE — 3017F COLORECTAL CA SCREEN DOC REV: CPT | Performed by: INTERNAL MEDICINE

## 2025-05-19 PROCEDURE — 1123F ACP DISCUSS/DSCN MKR DOCD: CPT | Performed by: INTERNAL MEDICINE

## 2025-05-19 PROCEDURE — 1126F AMNT PAIN NOTED NONE PRSNT: CPT | Performed by: INTERNAL MEDICINE

## 2025-05-19 PROCEDURE — G8417 CALC BMI ABV UP PARAM F/U: HCPCS | Performed by: INTERNAL MEDICINE

## 2025-05-19 PROCEDURE — 99214 OFFICE O/P EST MOD 30 MIN: CPT | Performed by: INTERNAL MEDICINE

## 2025-05-19 PROCEDURE — 1159F MED LIST DOCD IN RCRD: CPT | Performed by: INTERNAL MEDICINE

## 2025-05-19 NOTE — PROGRESS NOTES
Date    WBC 13.8 (H) 11/16/2022    HGB 14.2 11/16/2022    HCT 43.8 11/16/2022    MCV 91.1 11/16/2022     11/16/2022       Lab Results   Component Value Date    TSH 3.120 12/23/2019       Lab Results   Component Value Date    LABA1C 5.3 12/23/2019     Lab Results   Component Value Date     12/23/2019       Lab Results   Component Value Date    CHOL 100 03/30/2021    CHOL 154 12/28/2020    CHOL 152 12/23/2019     Lab Results   Component Value Date    TRIG 40 03/30/2021    TRIG 55 12/28/2020    TRIG 78 12/23/2019     Lab Results   Component Value Date    HDL 35 (L) 03/30/2021    HDL 37 (L) 12/28/2020    HDL 34 (L) 12/23/2019     No components found for: \"LDLCHOLESTEROL\", \"LDLCALC\"  Lab Results   Component Value Date    VLDL 11 03/30/2021    VLDL 11 12/28/2020    VLDL 16 12/23/2019     No results found for: \"CHOLHDLRATIO\"  Lab Results   Component Value Date    LDL 54 03/30/2021 02/27/23    TRANSTHORACIC ECHOCARDIOGRAM (TTE) COMPLETE (CONTRAST/BUBBLE/3D PRN) 02/28/2023  5:37 PM, 02/28/2023 12:00 AM (Final)    Interpretation Summary    Left Ventricle: Mildly reduced left ventricular systolic function with a visually estimated EF of 45 - 50%. Left ventricle size is normal. Normal wall thickness. Mild global hypokinesis present. Abnormal diastolic function.    Aortic Valve: Mild sclerosis of the aortic valve cusp. Mild regurgitation.    Mitral Valve: Mild to moderate regurgitation.    Left Atrium: Left atrium is mildly dilated. LA Vol Index is  25 ml/m2.    Aorta: Moderately dilated ascending aorta. Ao ascending diameter is 4.3 cm.    Signed by: Prince Fairchild, DO on 2/28/2023  5:37 PM, Signed by: Unknown Provider Result on 2/28/2023 12:00 AM        I have Independently reviewed prior care notes, any ER records available, cardiac testing, labs and results with the patient and before seeing the patient today.  Also independently reviewed outside records when available.       ASSESSMENT:    Zander

## 2025-05-27 ENCOUNTER — TELEPHONE (OUTPATIENT)
Age: 75
End: 2025-05-27

## 2025-05-27 NOTE — TELEPHONE ENCOUNTER
Rec'd the following notification from Petra that read as follows:  5/24/25 2:49pm EDT AF for 19.81 sec.max XC=599 bpm,Min.HR 140bpm.This was an auto detect.    5/26/25 10:32am  AF for 30 sec.EDT MAX NB=634 BPM,Min. BPM.    Pt.w/hx of Atach,monitor placed to rule out Afib.BP low lov 5/19.Can not tolerate BB or ACE due to low BP and HR per LOV.echo 6/12 FU 7/8.    I spoke w/pt on 5/24  and 5/26 he was at a [employer] games may have been cheering or yelling at referee.  Pt.was asymptomatic.    Not on any BP meds.

## 2025-06-13 ENCOUNTER — RESULTS FOLLOW-UP (OUTPATIENT)
Dept: CARDIOLOGY CLINIC | Age: 75
End: 2025-06-13

## 2025-06-13 DIAGNOSIS — I48.0 AF (PAROXYSMAL ATRIAL FIBRILLATION) (HCC): ICD-10-CM

## 2025-06-13 DIAGNOSIS — I10 HYPERTENSION: ICD-10-CM

## 2025-06-13 DIAGNOSIS — I42.0 DILATED CARDIOMYOPATHY (HCC): Primary | ICD-10-CM

## 2025-06-13 DIAGNOSIS — I71.20 THORACIC AORTIC ANEURYSM WITHOUT RUPTURE: ICD-10-CM

## 2025-06-13 NOTE — TELEPHONE ENCOUNTER
Stop ASA per .    Pt.notified of MD response and he v/u.I educated on Eliquis precautions,bleeding risk and to head to the ER should he fall and hit head or have hit to head,changing to electric razor and being more cautions and s/s of internal bleeding.Med was escribed as below and free 30 day coupon sent in to [facility] while he is on vacation.If med is unaffordable he will call for help.Labs ordered as below and referral made to EP.I sent note to scheduling to complete referral.  Orders Placed This Encounter   Procedures    Basic Metabolic Panel     Standing Status:   Future     Expected Date:   6/13/2025     Expiration Date:   6/13/2026    CBC     Standing Status:   Future     Expected Date:   6/13/2025     Expiration Date:   6/13/2026    [facility] Cardiology (Electrophysiology)[facility]     Referral Priority:   Routine     Referral Type:   Eval and Treat     Referral Reason:   Specialty Services Required     Requested Specialty:   Cardiology     Number of Visits Requested:   1     Requested Prescriptions     Signed Prescriptions Disp Refills    apixaban (ELIQUIS) 5 MG TABS tablet 60 tablet 0     Sig: Take 1 tablet by mouth 2 times daily

## 2025-06-13 NOTE — TELEPHONE ENCOUNTER
----- Message from Dr. Prince Fairchild, DO sent at 6/13/2025  4:05 PM EDT -----  Please call him, monitor showing PAF as reviewed before.    We need to get him on eliquis 5 BID.   Please explain NOACs to him, bleeding risk and so on.    Needs BMP and CBC before seeing me back.   Have him call for more symptoms.   Place referral to EP for AF consult.   Thanks

## 2025-06-13 NOTE — TELEPHONE ENCOUNTER
----- Message from Dr. Prince Fairchild, DO sent at 6/13/2025 11:26 AM EDT -----  Please call the patient.    ECHO was ok, nothing major or concerning.  Good news.   If having more angina,exertion related chest pain/pressure, worsening shortness of breath then please let us know. Will review more at follow up appointment and get plan for the future.    ThanksGurinder

## 2025-06-18 ENCOUNTER — TELEPHONE (OUTPATIENT)
Age: 75
End: 2025-06-18

## 2025-07-07 DIAGNOSIS — I71.20 THORACIC AORTIC ANEURYSM WITHOUT RUPTURE: ICD-10-CM

## 2025-07-07 DIAGNOSIS — I10 HYPERTENSION: ICD-10-CM

## 2025-07-07 DIAGNOSIS — I42.0 DILATED CARDIOMYOPATHY (HCC): ICD-10-CM

## 2025-07-07 LAB
ANION GAP SERPL CALC-SCNC: 9 MMOL/L (ref 7–16)
BUN SERPL-MCNC: 12 MG/DL (ref 8–23)
CALCIUM SERPL-MCNC: 9.1 MG/DL (ref 8.8–10.2)
CHLORIDE SERPL-SCNC: 106 MMOL/L (ref 98–107)
CO2 SERPL-SCNC: 28 MMOL/L (ref 20–29)
CREAT SERPL-MCNC: 0.97 MG/DL (ref 0.8–1.3)
ERYTHROCYTE [DISTWIDTH] IN BLOOD BY AUTOMATED COUNT: 13.8 % (ref 11.9–14.6)
GLUCOSE SERPL-MCNC: 93 MG/DL (ref 70–99)
HCT VFR BLD AUTO: 42.7 % (ref 41.1–50.3)
HGB BLD-MCNC: 13.5 G/DL (ref 13.6–17.2)
MCH RBC QN AUTO: 29.2 PG (ref 26.1–32.9)
MCHC RBC AUTO-ENTMCNC: 31.6 G/DL (ref 31.4–35)
MCV RBC AUTO: 92.2 FL (ref 82–102)
NRBC # BLD: 0 K/UL (ref 0–0.2)
PLATELET # BLD AUTO: 160 K/UL (ref 150–450)
PMV BLD AUTO: 11.9 FL (ref 9.4–12.3)
POTASSIUM SERPL-SCNC: 4.6 MMOL/L (ref 3.5–5.1)
RBC # BLD AUTO: 4.63 M/UL (ref 4.23–5.6)
SODIUM SERPL-SCNC: 142 MMOL/L (ref 136–145)
WBC # BLD AUTO: 5.5 K/UL (ref 4.3–11.1)

## 2025-07-08 ENCOUNTER — OFFICE VISIT (OUTPATIENT)
Age: 75
End: 2025-07-08
Payer: MEDICARE

## 2025-07-08 VITALS
BODY MASS INDEX: 28.2 KG/M2 | HEART RATE: 76 BPM | DIASTOLIC BLOOD PRESSURE: 76 MMHG | HEIGHT: 70 IN | WEIGHT: 197 LBS | SYSTOLIC BLOOD PRESSURE: 130 MMHG

## 2025-07-08 DIAGNOSIS — I47.19 ATRIAL TACHYCARDIA, PAROXYSMAL: ICD-10-CM

## 2025-07-08 DIAGNOSIS — I65.23 BILATERAL CAROTID ARTERY STENOSIS: ICD-10-CM

## 2025-07-08 DIAGNOSIS — I42.0 DILATED CARDIOMYOPATHY (HCC): Primary | ICD-10-CM

## 2025-07-08 DIAGNOSIS — I48.0 PAROXYSMAL ATRIAL FIBRILLATION (HCC): ICD-10-CM

## 2025-07-08 DIAGNOSIS — I71.21 ANEURYSM OF ASCENDING AORTA WITHOUT RUPTURE: ICD-10-CM

## 2025-07-08 DIAGNOSIS — I10 PRIMARY HYPERTENSION: ICD-10-CM

## 2025-07-08 PROCEDURE — 1123F ACP DISCUSS/DSCN MKR DOCD: CPT | Performed by: INTERNAL MEDICINE

## 2025-07-08 PROCEDURE — 3078F DIAST BP <80 MM HG: CPT | Performed by: INTERNAL MEDICINE

## 2025-07-08 PROCEDURE — 1159F MED LIST DOCD IN RCRD: CPT | Performed by: INTERNAL MEDICINE

## 2025-07-08 PROCEDURE — 3075F SYST BP GE 130 - 139MM HG: CPT | Performed by: INTERNAL MEDICINE

## 2025-07-08 PROCEDURE — G8427 DOCREV CUR MEDS BY ELIG CLIN: HCPCS | Performed by: INTERNAL MEDICINE

## 2025-07-08 PROCEDURE — 99214 OFFICE O/P EST MOD 30 MIN: CPT | Performed by: INTERNAL MEDICINE

## 2025-07-08 PROCEDURE — 1036F TOBACCO NON-USER: CPT | Performed by: INTERNAL MEDICINE

## 2025-07-08 PROCEDURE — 3017F COLORECTAL CA SCREEN DOC REV: CPT | Performed by: INTERNAL MEDICINE

## 2025-07-08 PROCEDURE — 1126F AMNT PAIN NOTED NONE PRSNT: CPT | Performed by: INTERNAL MEDICINE

## 2025-07-08 PROCEDURE — G8417 CALC BMI ABV UP PARAM F/U: HCPCS | Performed by: INTERNAL MEDICINE

## 2025-07-08 NOTE — PROGRESS NOTES
by mouth as needed      Cyanocobalamin (VITAMIN B 12 PO) Take by mouth (Patient not taking: Reported on 7/8/2025)      omeprazole (PRILOSEC) 20 MG delayed release capsule Take 1 capsule by mouth as needed (prn) (Patient not taking: Reported on 7/8/2025) 90 capsule 3    Pyridoxine HCl (VITAMIN B-6 PO) Take by mouth (Patient not taking: Reported on 7/8/2025)       No current facility-administered medications for this visit.        Allergies   Allergen Reactions    Rosuvastatin Myalgia     Patient Active Problem List    Diagnosis Date Noted    Dilated cardiomyopathy ([facility]) 03/08/2023     Priority: Medium    Paroxysmal atrial fibrillation ([facility]) 07/08/2025    Prostate cancer ([facility]) 11/07/2022     Added automatically from request for surgery [id number]      Elevated prostate specific antigen (PSA) 10/13/2022     Added automatically from request for surgery [id number]      Seasonal allergic rhinitis 03/30/2021    Nocturia 12/28/2020    Asbestosis ([facility]) 12/28/2020    Class 1 obesity due to excess calories with serious comorbidity and body mass index (BMI) of 30.0 to 30.9 in adult 12/23/2019    Atrial tachycardia, paroxysmal 01/23/2018    Thoracic aortic aneurysm without rupture 02/21/2017    Bilateral carotid artery disease 02/21/2017    Neck pain 01/03/2017    Hypertension 04/13/2016    Atherosclerosis of native coronary artery of native heart without angina pectoris 04/13/2016    Status post total left knee replacement 12/30/2015    History of throat cancer 12/04/2014    S/P total knee replacement using cement 11/25/2013    History of pneumonia     Hypercholesteremia 02/28/2013    Sleep apnea 02/28/2013    GERD (gastroesophageal reflux disease) 02/28/2013      Past Surgical History:   Procedure Laterality Date    CARDIAC CATHETERIZATION      COLONOSCOPY  1/04    Dr. Gonzalez with normal findings    COLONOSCOPY  01/14/2019    Dr. Andrew; diverticulosis and 2 fragments of tubular adenomas    KNEE ARTHROSCOPY Left 1990    PROSTATE

## 2025-08-14 ENCOUNTER — INITIAL CONSULT (OUTPATIENT)
Age: 75
End: 2025-08-14
Payer: MEDICARE

## 2025-08-14 VITALS
WEIGHT: 194 LBS | DIASTOLIC BLOOD PRESSURE: 76 MMHG | SYSTOLIC BLOOD PRESSURE: 120 MMHG | HEART RATE: 83 BPM | HEIGHT: 70 IN | BODY MASS INDEX: 27.77 KG/M2

## 2025-08-14 DIAGNOSIS — I48.0 PAROXYSMAL ATRIAL FIBRILLATION (HCC): Primary | ICD-10-CM

## 2025-08-14 DIAGNOSIS — I10 PRIMARY HYPERTENSION: ICD-10-CM

## 2025-08-14 PROCEDURE — G8417 CALC BMI ABV UP PARAM F/U: HCPCS | Performed by: INTERNAL MEDICINE

## 2025-08-14 PROCEDURE — G8428 CUR MEDS NOT DOCUMENT: HCPCS | Performed by: INTERNAL MEDICINE

## 2025-08-14 PROCEDURE — 1123F ACP DISCUSS/DSCN MKR DOCD: CPT | Performed by: INTERNAL MEDICINE

## 2025-08-14 PROCEDURE — 3074F SYST BP LT 130 MM HG: CPT | Performed by: INTERNAL MEDICINE

## 2025-08-14 PROCEDURE — 3078F DIAST BP <80 MM HG: CPT | Performed by: INTERNAL MEDICINE

## 2025-08-14 PROCEDURE — 99204 OFFICE O/P NEW MOD 45 MIN: CPT | Performed by: INTERNAL MEDICINE

## 2025-08-14 PROCEDURE — 1036F TOBACCO NON-USER: CPT | Performed by: INTERNAL MEDICINE

## 2025-08-14 PROCEDURE — 3017F COLORECTAL CA SCREEN DOC REV: CPT | Performed by: INTERNAL MEDICINE

## 2025-08-14 PROCEDURE — 1126F AMNT PAIN NOTED NONE PRSNT: CPT | Performed by: INTERNAL MEDICINE

## 2025-08-14 PROCEDURE — 93000 ELECTROCARDIOGRAM COMPLETE: CPT | Performed by: INTERNAL MEDICINE

## 2025-09-02 ENCOUNTER — TELEPHONE (OUTPATIENT)
Dept: ONCOLOGY | Age: 75
End: 2025-09-02

## (undated) DEVICE — ROBOTIC PROSTATE: Brand: MEDLINE INDUSTRIES, INC.

## (undated) DEVICE — SUTURE PDS II SZ 1 L27IN ABSRB VLT CT-1 L36MM 1/2 CIR Z341H

## (undated) DEVICE — AIRSEAL 12 MM ACCESS PORT AND PALM GRIP OBTURATOR WITH BLADELESS OPTICAL TIP, 120 MM LENGTH: Brand: AIRSEAL

## (undated) DEVICE — GLOVE SURG SZ 75 L12IN FNGR THK79MIL GRN LTX FREE

## (undated) DEVICE — SEALER LAP L37CM SHFT DIA10MM TISS FUS HAND/FOOT SWCH BLNT

## (undated) DEVICE — DRAPE TWL SURG 16X26IN BLU ORB04] ALLCARE INC]

## (undated) DEVICE — HOLDER PRB URONAV

## (undated) DEVICE — COVER MPLR TIP CRV SCIS ACC DA VINCI

## (undated) DEVICE — ABSORBENT, WATERPROOF, BACTERIA PROOF FILM DRESSING: Brand: OPSITE POST OP 9.5X8.5CM CTN 20

## (undated) DEVICE — CLIP INT L POLYMER LOK LIG HEM O LOK

## (undated) DEVICE — STERILE PACKED. BORE DIAMETER 1.6 MM; ANGLE OF INSERTION 19° TO THE LONG AXIS OF THE TRANSDUCER: Brand: SINGLE-USE BIPLANE BIOPSY GUIDE

## (undated) DEVICE — COVER PRB W1XL11.8IN ENDOCAVITY CLR E BND NEOGUARD

## (undated) DEVICE — PAD PT POS 36 IN SURGYPAD DISP

## (undated) DEVICE — SUTURE V-LOC 180 SZ 3-0 L6IN ABSRB VLT CV-23 L17MM 1/2 CIR VLOCM0804

## (undated) DEVICE — BAG SPEC REM 224ML W4XL6IN DIA10MM 1 HND GYN DISP ENDOPCH

## (undated) DEVICE — CANNULA SEAL

## (undated) DEVICE — BLADELESS OBTURATOR: Brand: WECK VISTA

## (undated) DEVICE — ANCHOR TISSUE RETRIEVAL SYSTEM, BAG SIZE 250 ML, PORT SIZE 12 MM: Brand: ANCHOR TISSUE RETRIEVAL SYSTEM

## (undated) DEVICE — SUTURE V-LOC 90 3-0 L6IN ABSRB UD CV-23 L17MM 1/2 CIR VLOCM1904

## (undated) DEVICE — PREMIUM WET SKIN PREP TRAY: Brand: MEDLINE INDUSTRIES, INC.

## (undated) DEVICE — COLUMN DRAPE

## (undated) DEVICE — MAX-CORE® DISPOSABLE CORE BIOPSY INSTRUMENT, 18G X 25CM: Brand: MAX-CORE

## (undated) DEVICE — ARM DRAPE

## (undated) DEVICE — TRI-LUMEN FILTERED TUBE SET WITH ACTIVATED CHARCOAL FILTER: Brand: AIRSEAL

## (undated) DEVICE — AGENT HEMSTAT W2XL4IN OXIDIZED REGENERATED CELOS ABSRB SFT

## (undated) DEVICE — DRAIN SURG 19FR 100% SIL RADPQ RND CHN FULL FLUT